# Patient Record
Sex: MALE | Race: BLACK OR AFRICAN AMERICAN | Employment: OTHER | URBAN - METROPOLITAN AREA
[De-identification: names, ages, dates, MRNs, and addresses within clinical notes are randomized per-mention and may not be internally consistent; named-entity substitution may affect disease eponyms.]

---

## 2017-06-26 ENCOUNTER — OFFICE VISIT (OUTPATIENT)
Dept: CARDIOLOGY CLINIC | Age: 57
End: 2017-06-26

## 2017-06-26 VITALS
DIASTOLIC BLOOD PRESSURE: 80 MMHG | HEIGHT: 71 IN | HEART RATE: 72 BPM | SYSTOLIC BLOOD PRESSURE: 130 MMHG | WEIGHT: 250 LBS | BODY MASS INDEX: 35 KG/M2 | OXYGEN SATURATION: 95 %

## 2017-06-26 DIAGNOSIS — R00.2 PALPITATIONS: ICD-10-CM

## 2017-06-26 DIAGNOSIS — R07.89 CHEST TIGHTNESS: Primary | ICD-10-CM

## 2017-06-26 DIAGNOSIS — K21.9 GASTROESOPHAGEAL REFLUX DISEASE, ESOPHAGITIS PRESENCE NOT SPECIFIED: ICD-10-CM

## 2017-06-26 DIAGNOSIS — R06.02 SOB (SHORTNESS OF BREATH): ICD-10-CM

## 2017-06-26 NOTE — PROGRESS NOTES
1. Have you been to the ER, urgent care clinic since your last visit? Hospitalized since your last visit? No     2. Have you seen or consulted any other health care providers outside of the [address] [place] [address] since your last visit? Include any pap smears or colon screening.  No

## 2017-06-26 NOTE — PROGRESS NOTES
HPI: :I saw Clifton Wang. Jackie Posadas, in my office today in cardiovascular evaluation regarding some problems that he has had with chest pain. Mr. Moraima Vilchis is a pleasant 60year old  male, who actually was initially seen in our practice by my former associate, Dr. So Bailon, on 2/3/16 for history of mitral valve prolapse without really any other cardiovascular symptomatology at that time except for occasional palpitations. He was examined by Dr. So Bailon who felt that he did not have any mitral valve prolapse and did not recommend an echocardiogram be done since it was not going to be changing the therapy. He explained to the patient that the diagnosis of mitral valve prolapse made many years ago was most likely related to the over-calling of mitral valve prolapse, which was common 25 years ago and has now resolved with better imaging equipment and with more stringent echo criteria. The patient comes into the office today and relates that he is doing reasonably well. He has had some intermittent left-sided chest discomfort, but but this discomfort goes around to his back and there is some tenderness in the eighth paravertebral musculature on the left and the discomfort that he describes seems to go around under his breast and there is some associated tenderness with it so I do not feel that this is cardiac and he tells me at one time he was having this discomfort 2 or 3 times a week but over the past year or 2 he has been having it only very occasionally may be a few times a year. He does have complaints of some PND and orthopnea as well as occasional palpitations, but these episodes are very occasional. He has also had some wheezing and sputum production and is going to be following up with Dr. Aydee Zimmerman in this regard. Encounter Diagnoses   Name Primary?     Chest tightness Yes    SOB (shortness of breath)     Palpitations     Gastroesophageal reflux disease, esophagitis presence not specified Discussion: This gentleman's chest discomfort clearly sounds noncardiac and historically he is done some heavy physical work over the years and particularly with the tenderness that I noticed in the eighth costochondral junction parasternally on the left I strongly suspect his chest discomfort is musculoskeletal.    His auscultatory examination of the heart does not suggest any mitral valve prolapse and I do not feel that further workup for that potential problem is really necessary at this time. His blood pressure appears to be borderline elevated initially but I checked it again myself and got 130/80 and with a stable appearing electrocardiogram I think at this juncture we can simply begin to follow him on an as-needed basis in the future. PCP: Leora Vasquez MD      Past Medical History:   Diagnosis Date    Diabetes ([address]     Borderline    GERD (gastroesophageal reflux disease)     H/O mitral valve prolapse     ASYMPTOMATIC       Past Surgical History:   Procedure Laterality Date    HX ORTHOPAEDIC      left knee surgery    HX ORTHOPAEDIC Left     Arm       Current Outpatient Rx   Name  Route  Sig  Dispense  Refill    calcium carbonate (TUMS) 200 mg calcium (500 mg) chew    Oral    Take 1 Tab by mouth as needed. No Known Allergies    Social History:   Social History   Substance Use Topics    Smoking status: Never Smoker    Smokeless tobacco: Never Used    Alcohol use Yes      Comment: socially         Family history: family history includes Cancer in his father; Hearing Impairment (age of onset: 76) in his maternal uncle; Hypertension in his maternal uncle. Review of Systems:   Constitutional: Positive for diaphoresis and malaise/fatigue. Negative for chills, fever and weight loss. Respiratory: Positive for cough, sputum production, shortness of breath and wheezing. Negative for hemoptysis.     Cardiovascular: Positive for chest pain, palpitations, orthopnea, claudication and PND. Negative for leg swelling. Gastrointestinal: Positive for heartburn, melena and nausea. Negative for abdominal pain, blood in stool, constipation, diarrhea and vomiting. Musculoskeletal: Positive for back pain, joint pain, myalgias and neck pain. Negative for falls. Neurological: Negative for weakness. Physical Exam:   The patient is an alert, oriented, well developed, well nourished 60 y.o.  male who was in no acute distress at the time of my examination. Visit Vitals    /80 (BP 1 Location: Left arm, BP Patient Position: Sitting)    Pulse 72    Ht 5' 11\" (1.803 m)    Wt 113.4 kg (250 lb)    SpO2 95%    BMI 34.87 kg/m2      BP Readings from Last 3 Encounters:   06/26/17 130/80   06/28/16 138/84   05/10/16 133/85      Wt Readings from Last 3 Encounters:   06/26/17 113.4 kg (250 lb)   06/28/16 113.4 kg (250 lb)   05/10/16 113.4 kg (250 lb)     HEENT: Conjuctiva white, mucosa moist, no pallor or cyanosis. NECK: Supple without masses, tenderness or thyromegaly. There was no jugular venous distention. Carotid are full bilaterally without bruits. CHEST: Symmetrical with good excursion. LUNGS: Clear to auscultation in all fields. HEART: Regular rate and rhythm. The apex is not displaced. There were no lifts, thrills or heaves. There is a normal S1 and S2 without appreciable murmurs, rubs, clicks, or gallops auscultated. ABDOMEN: Soft without masses, tenderness or organomegaly. EXTREMITIES: Full peripheral pulses without peripheral edema. INTEGUMENT: Skin is warm and dry   NEUROLOGICAL: The patient was oriented x3 with motor function grossly intact.     Review of Data: See PMH and Cardiology and Imaging sections for cardiac testing  No results found for: CHOL, CHOLX, CHLST, CHOLV, HDL, LDL, LDLC, DLDLP, TGLX, TRIGL, TRIGP, CHHD, CHHDX    Results for orders placed or performed in visit on 06/26/17   AMB POC EKG ROUTINE W/ 12 LEADS, INTER & REP     Status: None    Narrative    Normal sinus rhythm rate 72. This EKG is within normal limits and similar to the EKG of June 28, 2016. Renny Fitzgerald D.O., FRICKYC.C. [facility]  [facility] [place]  [address] [place] [address]. [address] [facility]  [phone]    PLEASE NOTE:  This document has been produced using voice recognition software. Unrecognized errors in transcription may be present.

## 2017-06-26 NOTE — PROGRESS NOTES
Review of Systems   Constitutional: Positive for diaphoresis and malaise/fatigue. Negative for chills, fever and weight loss. Respiratory: Positive for cough, sputum production, shortness of breath and wheezing. Negative for hemoptysis. Cardiovascular: Positive for chest pain, palpitations, orthopnea, claudication and PND. Negative for leg swelling. Gastrointestinal: Positive for heartburn, melena and nausea. Negative for abdominal pain, blood in stool, constipation, diarrhea and vomiting. Musculoskeletal: Positive for back pain, joint pain, myalgias and neck pain. Negative for falls. Neurological: Negative for weakness.

## 2017-06-26 NOTE — MR AVS SNAPSHOT
Visit Information Date & Time Provider Department Dept. Phone Encounter #  
 6/26/2017  1:20 PM Lorie Salcido DO [facility] [address] [id number] Upcoming Health Maintenance Date Due Hepatitis C Screening 1960 HEMOGLOBIN A1C Q6M 1960 LIPID PANEL Q1 1960 FOOT EXAM Q1 10/19/1968 MICROALBUMIN Q1 10/19/1968 EYE EXAM RETINAL OR DILATED Q1 10/19/1968 Pneumococcal 19-64 Medium Risk (1 of 1 - PPSV23) 10/19/1977 DTaP/Tdap/Td series (1 - Tdap) 10/19/1979 FOBT Q 1 YEAR AGE 50-75 10/19/2008 INFLUENZA AGE 9 TO ADULT 8/1/2017 Allergies as of 6/26/2017  Review Complete On: 6/26/2017 By: Lorie Salcido DO No Known Allergies Current Immunizations  Never Reviewed No immunizations on file. Not reviewed this visit You Were Diagnosed With   
  
 Codes Comments Chest tightness    -  Primary ICD-10-CM: R07.89 ICD-9-CM: 786.59   
 SOB (shortness of breath)     ICD-10-CM: R06.02 
ICD-9-CM: 786.05 Vitals BP Pulse Height(growth percentile) Weight(growth percentile) SpO2 BMI  
 140/78 72 5' 11\" (1.803 m) 250 lb (113.4 kg) 95% 34.87 kg/m2 Smoking Status Never Smoker Vitals History BMI and BSA Data Body Mass Index Body Surface Area 34.87 kg/m 2 2.38 m 2 Preferred Pharmacy Pharmacy Name Phone [id number] [phone] [address] [phone] Your Updated Medication List  
  
   
This list is accurate as of: 6/26/17  2:13 PM.  Always use your most recent med list.  
  
  
  
  
 calcium carbonate 200 mg calcium (500 mg) Charol Aver Commonly known as:  TUMS Take 1 Tab by mouth as needed. We Performed the Following AMB POC EKG ROUTINE W/ 12 LEADS, INTER & REP [54320 CPT(R)] Introducing [facility]!    
 William Montano introduces [employer] patient portal. Now you can access parts of your medical record, email your doctor's office, and request medication refills online. 1. In your internet browser, go to [email or web address] 2. Click on the First Time User? Click Here link in the Sign In box. You will see the New Member Sign Up page. 3. Enter your [employer] Access Code exactly as it appears below. You will not need to use this code after youve completed the sign-up process. If you do not sign up before the expiration date, you must request a new code. · [employer] Access Code: [email or web address] Expires: 9/24/2017  1:23 PM 
 
4. Enter the last four digits of your Social Security Number (xxxx) and Date of Birth (mm/dd/yyyy) as indicated and click Submit. You will be taken to the next sign-up page. 5. Create a [employer] ID. This will be your [employer] login ID and cannot be changed, so think of one that is secure and easy to remember. 6. Create a [employer] password. You can change your password at any time. 7. Enter your Password Reset Question and Answer. This can be used at a later time if you forget your password. 8. Enter your e-mail address. You will receive e-mail notification when new information is available in [address] 9. Click Sign Up. You can now view and download portions of your medical record. 10. Click the Download Summary menu link to download a portable copy of your medical information. If you have questions, please visit the Frequently Asked Questions section of the [employer] website. Remember, [employer] is NOT to be used for urgent needs. For medical emergencies, dial 911. Now available from your iPhone and Android! Please provide this summary of care documentation to your next provider. Your primary care clinician is listed as Katya Torres. If you have any questions after today's visit, please call [phone].

## 2017-12-13 ENCOUNTER — HOSPITAL ENCOUNTER (OUTPATIENT)
Dept: GENERAL RADIOLOGY | Age: 57
Discharge: HOME | End: 2017-12-13
Attending: FAMILY MEDICINE
Payer: MEDICAID

## 2017-12-13 DIAGNOSIS — M79.604 RIGHT LEG PAIN: ICD-10-CM

## 2017-12-13 PROCEDURE — 73552 X-RAY EXAM OF FEMUR 2/>: CPT

## 2017-12-16 ENCOUNTER — HOSPITAL ENCOUNTER (OUTPATIENT)
Age: 57
Discharge: HOME | End: 2017-12-16
Attending: FAMILY MEDICINE
Payer: MEDICAID

## 2017-12-16 DIAGNOSIS — M25.551 RIGHT HIP PAIN: ICD-10-CM

## 2017-12-16 PROCEDURE — 73721 MRI JNT OF LWR EXTRE W/O DYE: CPT

## 2020-08-18 ENCOUNTER — HOSPITAL ENCOUNTER (OUTPATIENT)
Dept: GENERAL RADIOLOGY | Age: 60
Discharge: HOME | End: 2020-08-18
Payer: COMMERCIAL

## 2020-08-18 DIAGNOSIS — M25.561 PAIN, JOINT, KNEE, RIGHT: ICD-10-CM

## 2020-08-18 PROCEDURE — 73562 X-RAY EXAM OF KNEE 3: CPT

## 2020-10-02 ENCOUNTER — OFFICE VISIT (OUTPATIENT)
Dept: ORTHOPEDIC SURGERY | Age: 60
End: 2020-10-02
Payer: COMMERCIAL

## 2020-10-02 VITALS
BODY MASS INDEX: 34.22 KG/M2 | HEIGHT: 71 IN | DIASTOLIC BLOOD PRESSURE: 88 MMHG | TEMPERATURE: 96.2 F | RESPIRATION RATE: 15 BRPM | SYSTOLIC BLOOD PRESSURE: 136 MMHG | WEIGHT: 244.4 LBS | HEART RATE: 79 BPM

## 2020-10-02 DIAGNOSIS — M22.2X1 PATELLOFEMORAL DISORDER, RIGHT: Primary | ICD-10-CM

## 2020-10-02 DIAGNOSIS — M23.41 LOOSE, BODY, JOINT, KNEE, RIGHT: ICD-10-CM

## 2020-10-02 PROCEDURE — 99204 OFFICE O/P NEW MOD 45 MIN: CPT | Performed by: FAMILY MEDICINE

## 2020-10-02 RX ORDER — MELOXICAM 15 MG/1
TABLET ORAL
Qty: 90 TAB | Refills: 0 | Status: SHIPPED | OUTPATIENT
Start: 2020-10-02 | End: 2022-07-27

## 2020-10-02 NOTE — Clinical Note
Please send copy this note to Dr. Elio Moon at [facility] on [employer].

## 2020-10-02 NOTE — PROGRESS NOTES
AVS reviewed: YES  HEP: Pt declined demo  Resources Provided: YES YouTube Videos & PT order  Patient questions/concerns answered: NO. Pt denied questions/concerns  Patient verbalized understanding of treatment plan: YES

## 2020-10-02 NOTE — PROGRESS NOTES
HISTORY OF PRESENT ILLNESS    Rossy Wang Sr. 1960 is a 62y.o. year old male comes in today as new patient for: right knee pain    Patients symptoms have been present for 6 months. Pain level 5/10 anterior. It has worsened with certain motions and will want to hyperextend. Patient has tried:  Het/baths, icy hot and brace with benefit. It is described as pain after falling on knee playing football 6 months ago. IMAGING: XR right knee 8/18/2020  IMPRESSION:  No acute radiographic findings. Notable degenerative changes characterized by  large suprapatellar loose body, also present on prior. Past Surgical History:   Procedure Laterality Date    HX ORTHOPAEDIC      left knee surgery    HX ORTHOPAEDIC Left     Arm     Social History     Socioeconomic History    Marital status: SINGLE     Spouse name: Not on file    Number of children: Not on file    Years of education: Not on file    Highest education level: Not on file   Tobacco Use    Smoking status: Never Smoker    Smokeless tobacco: Never Used   Substance and Sexual Activity    Alcohol use: Yes     Comment: socially    Drug use: No    Sexual activity: Not Currently      Current Outpatient Medications   Medication Sig Dispense Refill    calcium carbonate (TUMS) 200 mg calcium (500 mg) chew Take 1 Tab by mouth as needed. Past Medical History:   Diagnosis Date    Diabetes ([address]     Borderline    GERD (gastroesophageal reflux disease)     H/O mitral valve prolapse     ASYMPTOMATIC     Family History   Problem Relation Age of Onset    Cancer Father     Hearing Impairment Maternal Uncle 76    Hypertension Maternal Uncle          ROS:  No locking. + bruise, no swell. All other systems reviewed and negative aside from that written in the HPI. Objective:  /88   Pulse 79   Temp (!) 96.2 °F (35.7 °C)   Resp 15   Ht 5' 11\" (1.803 m)   Wt 244 lb 6.4 oz (110.9 kg)   BMI 34.09 kg/m²   GEN: Appears stated age in NAD.   HEAD: Normocephalic, atraumatic. NEURO:  Sensation intact light touch B/L lower extremities. MS:  LE Strength +5/5 bilateral .   right Knee:  1+ Effusion . Lachman negative. Valgus negative. Varus negative. negative joint line tenderness medial.  Mohsen negative. Posterior drawer negative. Noble compression test negative. Patellar apprehension negative. Patellar facet  positive tender to palpation medial no crepitance bilateral .  Pes anserine negative TTP bilateral   EXT: no clubbing/cyanosis. no edema. SKIN: Warm/dry without rash. HEENT: Conjunctiva/lids WNL. External canals/nares WNL. Tongue midline. PERRL, EOMI. Hearing intact. NECK: Trachea midline. Supple, Full ROM. No thyromegaly. CARDIAC: No edema. LUNGS: Normal effort. ABD: Soft, no masses. No HSM. PSYCH: A+O x3. Appropriate judgment and insight. Assessment/Plan:     ICD-10-CM ICD-9-CM    1. Patellofemoral disorder, right  M22.2X1 719.96 REFERRAL TO PHYSICAL THERAPY      meloxicam (MOBIC) 15 mg tablet   2. Loose, body, joint, knee, right  M23.41 717.6 REFERRAL TO PHYSICAL THERAPY      meloxicam (MOBIC) 15 mg tablet       Patient (or guardian if minor) verbalizes understanding of evaluation and plan. Will start PT and HEP w/ mobic dialy an dice if sore. RTC 6 weeks.

## 2020-10-02 NOTE — PATIENT INSTRUCTIONS
Follow-up on referral to physical therapy, perform home exercises, use medication as prescribed, ice as needed, and return to the office in about 6 weeks. Search YouTube for my channel: 
 
Dr. Alexandria Rangel

## 2020-10-14 ENCOUNTER — HOSPITAL ENCOUNTER (OUTPATIENT)
Dept: PHYSICAL THERAPY | Age: 60
Discharge: HOME | End: 2020-10-14
Payer: COMMERCIAL

## 2020-10-14 PROCEDURE — 97110 THERAPEUTIC EXERCISES: CPT

## 2020-10-14 PROCEDURE — 97161 PT EVAL LOW COMPLEX 20 MIN: CPT

## 2020-10-14 NOTE — PROGRESS NOTES
[address] [facility] PHYSICAL THERAPY AT [facility] [address] [place], [address] [facility] [address] - Phone: [phone]  Fax: [phone] / [address] [facility]  Patient Name: Arabella Marie Sr. : 1960   Medical   Diagnosis: Right knee pain [M25.561] Treatment Diagnosis: Right Knee Pain   Onset Date: 2020     Referral Source: [place] Blank Start of Care [facility]): 10/14/2020   Prior Hospitalization: See medical history Provider #: [id number]   Prior Level of Function: Hx left knee pain   Comorbidities: Bilateral knee OA   Medications: Verified on Patient Summary List   The Plan of Care and following information is based on the information from the initial evaluation.   ===========================================================================================  Assessment / key information:  Opal John is a 62 y.o.  yo male with Dx of Right knee pain [M25.561]. Patient reports onset of symptoms in July of this year after playing with grandchildren and falling on left knee. Patient currently rates pain as 8/10 at worst, 1/10 at best, primarily located at right knee. Patient complains of difficulty and increase pain upon waking and with walking, stairs and squatting. Objective Findings:  Gait: decreased bilateral knee extension. Right knee AROM: 0-115 degrees. Manual Muscle Testin+/5 bilateral LE strength. Quad Set: R = reduced , L = reduced. Lateral glide with decreased right VMO activation. Mild edema right knee. FOTO Score 55 points.  Pt instructed in HEP and will f/u in clinic for PT.  ===========================================================================================  Eval Complexity: History HIGH Complexity :3+ comorbidities / personal factors will impact the outcome/ POC ;  Examination  HIGH Complexity : 4+ Standardized tests and measures addressing body structure, function, activity limitation and / or participation in recreation ; Presentation LOW Complexity : Stable, uncomplicated ;  Decision Making MEDIUM Complexity : FOTO score of 26-74; Overall Complexity LOW   Problem List: pain affecting function, decrease ROM, decrease strength, impaired gait/ balance, decrease ADL/ functional abilitiies and decrease activity tolerance   Treatment Plan may include any combination of the following: Therapeutic exercise, Neuromuscular re-education, Physical agent/modality, Gait/balance training, Manual therapy and Patient education  Patient / Family readiness to learn indicated by: asking questions, trying to perform skills and interest  Persons(s) to be included in education: patient (P)  Barriers to Learning/Limitations: None  Measures taken, if barriers to learning:    Patient Goal (s): \"Strength in knee\"   Patient self reported health status: excellent  Rehabilitation Potential: good   Short Term Goals: To be accomplished in  2  weeks:  1. Patient will achieve +2 on GROC to improve tolerance to ADLs. 2. Patient will increase FOTO Score to 61 to improve tolerance to ADLs. 3. Patient will report 3/10 pain at worst to improve tolerance to ADLs.  Long Term Goals: To be accomplished in 4  weeks:  1. Patient will achieve +4 on GROC to improve tolerance to ADLs. 2.  Patient will increase FOTO Score to 67 to improve tolerance to ADLs. 3.  Patient will report 75% improvement in symptoms to improve to ADLs. Frequency / Duration:   Patient to be seen  2  times per week for 4  weeks:  Patient / Caregiver education and instruction: exercises  Therapist Signature: STEPHANIE Agarwal Date: 62/99/9409   Certification Period: n/a Time: 6:06 PM   ===========================================================================================  I certify that the above Physical Therapy Services are being furnished while the patient is under my care.   I agree with the treatment plan and certify that this therapy is necessary. Physician Signature:        Date:       Time:     Please sign and return to [facility] or you may fax the signed copy to [phone]. Thank you.

## 2020-10-14 NOTE — PROGRESS NOTES
PHYSICAL THERAPY - DAILY TREATMENT NOTE      Patient Name: Alyssa Johnson Sr.        Date: 10/14/2020  : 1960   YES Patient  Verified  Visit #:   1   of   8  Insurance: Payor: Margaret Vazquez / Plan: [employer] HMO/CHOICE PLUS/POS / Product Type: HMO /      In time: 5:30 Out time: 6:05   Total Treatment Time: 35     Medicare Time/BCBS Tracking (below)   Total Timed Codes (min):  n/a 1:1 Treatment Time:  n/a     TREATMENT AREA = Right knee pain [M25.561]     SUBJECTIVE    Pain Level (on 0 to 10 scale):  1  / 10   Medication Changes/New allergies or changes in medical history, any new surgeries or procedures?     NO    If yes, update Summary List   Subjective Functional Status/Changes:  []  No changes reported       See Plan of Care       OBJECTIVE  Modalities Rationale:     decrease pain to improve patient's ability to perform ADLs      min [] Estim, type/location:                                      []  att     []  unatt     []  w/US     []  w/ice    []  w/heat    min []  Mechanical Traction: type/lbs                   []  pro   []  sup   []  int   []  cont    []  before manual    []  after manual    min []  Ultrasound, settings/location:      min []  Iontophoresis w/ dexamethasone, location:                                               []  take home patch       []  in clinic   10 min []  Ice     [x]  Heat    location/position: Supine right knee    min []  Vasopneumatic Device, press/temp:     min []  Other:    [x] Skin assessment post-treatment (if applicable):    []  intact    [x]  redness- no adverse reaction     []redness  adverse reaction:      10 min Therapeutic Exercise:  [x]  See flow sheet   Rationale:      increase ROM and increase strength to improve the patients ability to perform ADLs     Billed With/As:   [x] TE   [] TA   [] Neuro   [] Self Care Patient Education: [x] Review HEP    [] Progressed/Changed HEP based on:   [] positioning   [] body mechanics   [] transfers [] heat/ice application    [] other:      Other Objective/Functional Measures:    See Plan of Care     Post Treatment Pain Level (on 0 to 10) scale:   1  / 10     ASSESSMENT    Assessment/Changes in Function:     See Plan of Care     []  See Progress Note/Recertification   Patient will continue to benefit from skilled PT services to modify and progress therapeutic interventions, address functional mobility deficits, address ROM deficits, address strength deficits, analyze and address soft tissue restrictions, analyze and cue movement patterns, analyze and modify body mechanics/ergonomics and assess and modify postural abnormalities to attain remaining goals. to attain remaining goals. Progress toward goals / Updated goals:    See Plan of Care     PLAN    [x]  Upgrade activities as tolerated YES Continue plan of care   []  Discharge due to :    []  Other:      Therapist: Juan Izaguirre PT    Date: 10/14/2020 Time: 6:00 PM   No future appointments.

## 2020-10-21 ENCOUNTER — HOSPITAL ENCOUNTER (OUTPATIENT)
Dept: PHYSICAL THERAPY | Age: 60
Discharge: HOME | End: 2020-10-21
Payer: COMMERCIAL

## 2020-10-21 PROCEDURE — 97110 THERAPEUTIC EXERCISES: CPT

## 2020-10-21 PROCEDURE — 97140 MANUAL THERAPY 1/> REGIONS: CPT

## 2020-10-21 NOTE — PROGRESS NOTES
PHYSICAL THERAPY - DAILY TREATMENT NOTE    Patient Name: Lizzette Dos Santos Sr.        Date: 10/21/2020  : 1960   yes Patient  Verified  Visit #:   2   of   8  Insurance: Payor: Chao Alvarez / Plan: [employer] HMO/CHOICE PLUS/POS / Product Type: HMO /      In time: 4:02 Out time: 4:50   Total Treatment Time: 48     Medicare/[facility] Time Tracking (below)   Total Timed Codes (min):  48 1:1 Treatment Time:  48     TREATMENT AREA =  Right knee pain [M25.561]    SUBJECTIVE  Pain Level (on 0 to 10 scale):  0   / 10   Medication Changes/New allergies or changes in medical history, any new surgeries or procedures?    no  If yes, update Summary List   Subjective Functional Status/Changes:  []  No changes reported     Doing a little better. No pain right now but pain last night was like a 12. My pain is primarily at night. OBJECTIVE  Modalities Rationale:    PD    40 min Therapeutic Exercise:  [x]  See flow sheet   Rationale:      increase ROM and increase strength to improve the patients ability to tolerate prolonged ambulation     8 min Manual Therapy: Prone STM to R hamstring and gastroc, STM and rollator to R ITB   Rationale:      decrease pain, increase ROM, increase tissue extensibility, decrease edema  and decrease trigger points to improve patient's ability to rise in the morning    Billed With/As:   [x] TE   [] TA   [] Neuro   [] Self Care Patient Education: [x] Review HEP    [x] Progressed/Changed HEP based on:   [] positioning   [] body mechanics   [] transfers   [] heat/ice application    [] other:      Other Objective/Functional Measures:     Added incline calf str, squat, Bridge, clam, SLR w/ ER and lateral mini band  Cuing with mini squatting for incr ABDULKADIR and hip excursion in order to ensure proper form   TTP and incr mm tone to R ITB distally     Post Treatment Pain Level (on 0 to 10) scale:   0  / 10     ASSESSMENT  Assessment/Changes in Function:     Progressed R LE strengthening and flexibility program with good patient tolerance. Patient edu on Sleep positioning of the knees and use of pillow between the knees in order to decr pp on the knees with static positioning. []  See Progress Note/Recertification   Patient will continue to benefit from skilled PT services to modify and progress therapeutic interventions, address functional mobility deficits, address ROM deficits, address strength deficits, analyze and address soft tissue restrictions, analyze and cue movement patterns, analyze and modify body mechanics/ergonomics, assess and modify postural abnormalities and instruct in home and community integration to attain remaining goals. Progress toward goals / Updated goals:    First visit after initial evaluation. Progress tx per POC.         PLAN  [x]  Upgrade activities as tolerated yes Continue plan of care   []  Discharge due to :    []  Other:      Therapist: Liza Perez    Date: 10/21/2020 Time: 4:04 PM     Future Appointments   Date Time Provider Steffen Brumfield   10/26/2020  2:00 PM [address] [facility]   10/28/2020  3:15 PM [address] [facility]   11/2/2020  2:00 PM [address] [facility]   11/4/2020  3:15 PM [address] [facility]   11/9/2020  1:45 PM [address]

## 2020-10-29 ENCOUNTER — APPOINTMENT (OUTPATIENT)
Dept: PHYSICAL THERAPY | Age: 60
End: 2020-10-29
Payer: COMMERCIAL

## 2020-11-04 ENCOUNTER — APPOINTMENT (OUTPATIENT)
Dept: PHYSICAL THERAPY | Age: 60
End: 2020-11-04

## 2020-11-09 ENCOUNTER — APPOINTMENT (OUTPATIENT)
Dept: PHYSICAL THERAPY | Age: 60
End: 2020-11-09

## 2020-12-02 NOTE — PROGRESS NOTES
[address] PHYSICAL THERAPY  [address] [place] [address], [place] [address],[facility], [address] [place] [address] - Phone: [phone]  Fax: [phone]  DISCHARGE SUMMARY  Patient Name: Alisa Norris Sr. : 1960   Treatment/Medical Diagnosis: Right knee pain [M25.561]   Referral Source: Mendy Degroot DO     Date of Initial Visit: 10/14/2020 Attended Visits: 2 Missed Visits: 4     SUMMARY OF TREATMENT  Patient has attended 2 PT sessions, including an initial evaluation, for R knee pain. PT has included manual therapy, therapeutic exercises, patient education, body mechanics, posture modification, and home exercise program to improve AROM/flexibility and quad/glute stability as well as decrease pain. CURRENT STATUS  Patient attended only initial evaluation and 1 follow-ups and then did not return. Therefore, a formal re-assessment of goals was not performed. Goal/Measure of Progress Goal Met? 1. Patient will achieve +2 on GROC to improve tolerance to ADLs. Status at last Eval: Goal Established Current Status: Unable to be assessed no   2. Patient will increase FOTO Score to 61 to improve tolerance to ADLs. Status at last Eval: FOTO = 55/100 Current Status: Unable to be assessed no   3. Patient will report 3/10 pain at worst to improve tolerance to ADLs. Status at last Eval: 8/10 pain at the worst Current Status: Unable to be assessed no     RECOMMENDATIONS  Discontinue therapy due to lack of attendance or compliance. If you have any questions/comments please contact us directly at [phone]. Thank you for allowing us to assist in the care of your patient.     Therapist Signature: Jose L Ryan Date: 10/21/2020     Time: 1:37 PM

## 2021-03-02 ENCOUNTER — OFFICE VISIT (OUTPATIENT)
Dept: NEUROLOGY | Age: 61
End: 2021-03-02
Payer: COMMERCIAL

## 2021-03-02 VITALS
BODY MASS INDEX: 34.44 KG/M2 | SYSTOLIC BLOOD PRESSURE: 130 MMHG | TEMPERATURE: 97.9 F | WEIGHT: 246 LBS | RESPIRATION RATE: 18 BRPM | HEIGHT: 71 IN | HEART RATE: 67 BPM | DIASTOLIC BLOOD PRESSURE: 80 MMHG

## 2021-03-02 DIAGNOSIS — R20.2 NUMBNESS AND TINGLING OF RIGHT HAND: Primary | ICD-10-CM

## 2021-03-02 DIAGNOSIS — R20.0 NUMBNESS AND TINGLING OF RIGHT HAND: Primary | ICD-10-CM

## 2021-03-02 PROCEDURE — 99204 OFFICE O/P NEW MOD 45 MIN: CPT | Performed by: NURSE PRACTITIONER

## 2021-03-02 NOTE — PROGRESS NOTES
Parish Hughes presents today for   Chief Complaint   Patient presents with    Numbness    New Patient       Is someone accompanying this pt? no    Is the patient using any DME equipment during OV? no    Depression Screening:  No flowsheet data found. Learning Assessment:  Learning Assessment 6/28/2016   PRIMARY LEARNER Patient   PRIMARY LANGUAGE ENGLISH   LEARNER PREFERENCE PRIMARY DEMONSTRATION   ANSWERED BY patient   RELATIONSHIP SELF       Abuse Screening:  No flowsheet data found. Fall Risk  No flowsheet data found. Coordination of Care:  1. Have you been to the ER, urgent care clinic since your last visit? Hospitalized since your last visit? no    2. Have you seen or consulted any other health care providers outside of the [address] [place] [address] since your last visit? Include any pap smears or colon screening.  no

## 2021-03-02 NOTE — PROGRESS NOTES
[facility]  [address], [address], [place], [address]  [address] [facility], [address]  Office:  [phone]  Fax: [phone]    Referring: Marleny Bass MD    Chief Complaint   Patient presents with    Numbness    New Patient       HPI:  This is a 56year old male who presents for numbness and tingling to the hand. Localized to the right hand. He is right hand dominant. Started about a year and half ago maybe two years ago. Encompasses his entire right hand to his wrist. Associated with pins and needles. Waking him up at night. Occurs solely when he is lying flat. Can go to bed and one hour later his right hand will start tingling. He said he will have to dangle his arm off the side of the bed and move his hand around for it to stop. Does not happen during the day. Denies weakness or dropping things. 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 He endorses history of sheet of glass coming down and cutting him in his left forearm. Caused nerve damage to his left hand 5th digit. Denies neck pain or pain going down the arms. Denies headaches, lightheadedness, or dizziness. Denies focal weakness. He tells me he is prediabetic. Denies numbness tingling to the left arm or lower extremities. No other concerns at this time.       Social History     Socioeconomic History    Marital status: SINGLE     Spouse name: Not on file    Number of children: Not on file    Years of education: Not on file    Highest education level: Not on file   Occupational History    Not on file   Social Needs    Financial resource strain: Not on file    Food insecurity     Worry: Not on file     Inability: Not on file    Transportation needs     Medical: Not on file     Non-medical: Not on file   Tobacco Use    Smoking status: Never Smoker    Smokeless tobacco: Never Used   Substance and Sexual Activity    Alcohol use: Yes     Comment: socially  Drug use: No    Sexual activity: Not Currently   Lifestyle    Physical activity     Days per week: Not on file     Minutes per session: Not on file    Stress: Not on file   Relationships    Social connections     Talks on phone: Not on file     Gets together: Not on file     Attends Taoism service: Not on file     Active member of club or organization: Not on file     Attends meetings of clubs or organizations: Not on file     Relationship status: Not on file    Intimate partner violence     Fear of current or ex partner: Not on file     Emotionally abused: Not on file     Physically abused: Not on file     Forced sexual activity: Not on file   Other Topics Concern    Not on file   Social History Narrative    Not on file       Family History   Problem Relation Age of Onset    Cancer Father     Hearing Impairment Maternal Uncle 76    Hypertension Maternal Uncle        Current Outpatient Medications   Medication Sig Dispense Refill    meloxicam (MOBIC) 15 mg tablet Take 1 tab daily as needed pain with food. 90 Tab 0    calcium carbonate (TUMS) 200 mg calcium (500 mg) chew Take 1 Tab by mouth as needed.          Past Medical History:   Diagnosis Date    Diabetes ([address]     Borderline    GERD (gastroesophageal reflux disease)     H/O mitral valve prolapse     ASYMPTOMATIC       Past Surgical History:   Procedure Laterality Date    HX ORTHOPAEDIC      left knee surgery    HX ORTHOPAEDIC Left     Arm       No Known Allergies    Patient Active Problem List   Diagnosis Code    Arm contusion S40.46A    Sprain and strain of shoulder and upper arm [id number]    Diabetes ([address] E11.9    GERD (gastroesophageal reflux disease) K21.9    H/O mitral valve prolapse Z86.79         Review of Systems:   Constitutional: no fever or chills  Skin denies rash or itching  HEENT:  Denies tinnitus, hearing loss, or visual changes  Respiratory: denies shortness of breath  Cardiovascular: denies chest pain, dyspnea on exertion  Gastrointestinal: does not report nausea or vomiting  Genitourinary: does not report dysuria or incontinence  Musculoskeletal: does not report joint pain or swelling  Endocrine: denies weight change  Hematology: denies easy bruising or bleeding   Neurological: as above in HPI      PHYSICAL EXAMINATION:      VITAL SIGNS:    Visit Vitals  /80 (BP 1 Location: Left upper arm, BP Patient Position: Sitting, BP Cuff Size: Adult)   Pulse 67   Temp 97.9 °F (36.6 °C)   Resp 18   Ht 5' 11\" (1.803 m)   Wt 111.6 kg (246 lb)   BMI 34.31 kg/m²       GENERAL: Well developed, well nourished, in no apparent distress. HEART: RR, no murmurs heard, no carotid bruits  LUNGS:                      CTAB  EXTREMITIES: No clubbing, cyanosis, or edema is identified. Pulses 2+    and symmetrical.  HEAD:   Normocephalic, atraumatic. NEUROLOGIC EXAMINATION    MENTAL STATUS: Awake, alert, and oriented x 4. Attention and STM are grossly normal. There is no aphasia. Fund of knowledge is adequate. Mood and affect are appropriate  CRANIAL NERVES: Visual fields are full to confrontation. No fundus anomalies observed. Pupils are reactive to light and accommodation. Extraocular movements are intact and there is no nystagmus. Facial sensation is normal  Face is symmetrical.   Hearing is grossly intact. SCM/TPZ 5/5  Palate rises symmetrically. Tongue is in the midline. MOTOR:   Normal tone, bulk, and strength, 5/5 muscle strength throughout left hand 5th digit contracted. No cogwheel rigidity or clonus present. CEREBELLAR: Finger to nose was normal.   No tremors or dysmetria    SENSORY:  Normal PP, vibration, proprioception despite decreased sensation to left hand 5th digit.  Negative tinel and phalens test. Romberg negative    DTR's:   +2 throughout, toes downgoing     GAIT:   Normal gait      Impression/Plan  Nereyda Patel. is a 56 y.o. male whose history and physical are consistent with tingling to the right hand upon lying flat. DDx includes mononeuropathy vs CTS vs other. Has been going on for the past 2 years. Examination is reassuringly non focal.  Will order EMG of the upper extremities. I will see him back for results afterwards and discuss treatment plans pending results. All questions addressed and patient is agreeable with plan of care. Diagnoses and all orders for this visit:    1. Numbness and tingling of right hand  -     EMG LIMITED      I spent 45 minutes with the patient in face-to-face consultation, with 35 minutes spent in counseling and coordination of care as described above. This note will not be viewable in [address] Signed By: Dominga Boucher NP        PLEASE NOTE:   Portions of this document may have been produced using voice recognition software. Unrecognized errors in transcription may be present.

## 2021-04-21 ENCOUNTER — HOSPITAL ENCOUNTER (OUTPATIENT)
Dept: PHYSICAL THERAPY | Age: 61
Discharge: HOME | End: 2021-04-21
Payer: MEDICAID

## 2021-04-21 PROCEDURE — 97162 PT EVAL MOD COMPLEX 30 MIN: CPT | Performed by: GENERAL ACUTE CARE HOSPITAL

## 2021-04-21 PROCEDURE — 97535 SELF CARE MNGMENT TRAINING: CPT | Performed by: GENERAL ACUTE CARE HOSPITAL

## 2021-04-21 PROCEDURE — 97110 THERAPEUTIC EXERCISES: CPT | Performed by: GENERAL ACUTE CARE HOSPITAL

## 2021-04-21 NOTE — PROGRESS NOTES
PHYSICAL THERAPY - DAILY TREATMENT NOTE     Patient Name: Carine Warren Sr.        Date: 2021  : 1960   YES Patient  Verified  Visit #:      12  Insurance: Payor: Wayne Pastrana / Plan: [facility] [employer]AN [employer] / Product Type: Managed Care Medicaid /      In time: 335 Out time: 425   Total Treatment Time: 50     Medicare/[employer] Time Tracking (below)   Total Timed Codes (min):  16 - NC 1:1 Treatment Time:  50     TREATMENT AREA =  Pain in right knee [M25.561]    SUBJECTIVE    Pain Level (on 0 to 10 scale):  3-4  / 10   Medication Changes/New allergies or changes in medical history, any new surgeries or procedures? NO    If yes, update Summary List   Subjective Functional Status/Changes:  []  No changes reported       See POC         OBJECTIVE    8 min Therapeutic Exercise:  [x]  See flow sheet; NC per insurance restrictions   Rationale:      increase ROM and increase strength to improve the patients ability to squat, amb, stairs       8 min Self Care: edu on proper squat mechanics, exercises to avoid (knee ext machine past 90 deg flexion), avoid sleeping with pillow under knee at night. Edu on importance of strengthening quad and gluts for improved mobility. NC per insurance restrictions    Rationale: To educate patient about proper squatting, lifting mechanics, and injury prevention    Billed With/As:   [] TE   [] TA   [] Neuro   [] Self Care Patient Education: [x] Review HEP    [] Progressed/Changed HEP based on:   [] positioning   [] body mechanics   [] transfers   [] heat/ice application    [] other: Given initial HEP - see chart for copy.         Other Objective/Functional Measures:    Justification for Eval Code Complexity:  Patient History : no comorbidities, L knee surgery at 10 y/o  Examination see exam   Clinical Presentation: evolving  Clinical Decision Making : FOTO : 43 /100       Post Treatment Pain Level (on 0 to 10) scale:   3  / 10 ASSESSMENT    Assessment/Changes in Function:     See POC     []  See Progress Note/Recertification   Patient will continue to benefit from skilled PT services to modify and progress therapeutic interventions, address functional mobility deficits, address ROM deficits, address strength deficits, analyze and address soft tissue restrictions, analyze and cue movement patterns, analyze and modify body mechanics/ergonomics, assess and modify postural abnormalities, address imbalance/dizziness and instruct in home and community integration to attain remaining goals.       Progress toward goals / Updated goals:     See POC       PLAN    [x]  Upgrade activities as tolerated YES Continue plan of care   []  Discharge due to :    []  Other:      Therapist: Ene Cooney PT    Date: 4/21/2021 Time: 10:42 AM     Future Appointments   Date Time Provider Steffen Brumfield   4/21/2021  3:30 PM [address] PT [place] [facility] [address]   5/14/2021  9:30 AM [facility] NURSE VISIT [facility] BS AMB   5/26/2021  2:00 PM Glen Butterfield MD [facility] BS AMB

## 2021-04-21 NOTE — PROGRESS NOTES
[facility] PHYSICAL THERAPY AT [facility] [address] [place], [address] - Phone: [phone]  Fax: [phone] / [address] [facility]  Patient Name: Rancho Plasencia Sr. : 1960   Medical   Diagnosis: Pain in right knee [M25.561] Treatment Diagnosis: Pain in right knee [M25.561]   Onset Date: ~1 yr ago      Referral Source: Radha Balbuena MD [facility]): 2021   Prior Hospitalization: See medical history Provider #: [id number]   Prior Level of Function: Independent with ADL's. Comorbidities: None listed. H/o L knee surgery at 11 yrs old   Medications: Verified on Patient Summary List   The Plan of Care and following information is based on the information from the initial evaluation.   ===================================================================  Assessment / key information:  Pt is a 57 yo male who presents with co R knee pain that began about 9 months-1 yr ago after falling on a concrete surface. X-ray and MRI completed about 5 months ago were (-) for significant pathology, except showed some bone fragments around patella. Pt reports ongoing pain since initial fall. Pt has been wearing a neoprene brace on B knees for support for about 3 months. PMH significant for L knee surgery when pt was 13 yrs ago - states \"they removed some cartilage. \" Pt denies any previous injury to R knee prior to fall. Pt reports current pain level 3-4/10, pain level at worst 10/10, and pain level at best 3/10. Aggravating factors: standing, walking without knee braces, stairs, squatting, kneeling. Relieving factors: wearing B knee braces. Objective: Gait:  slowed diego, antalgic pattern. AROM: Knee AROM: R 0-125. SLR: good quad set, no quad lag. Patellar mobility: very guarded and sensitive to touch. Flexibility: HS 90/90 (+), Magaly (+).  Palpation: +ttp patellar tendon, all aspects around patella - very sensitive. Swelling: none observed. MMT: Hip flex 5/5, abd 4/5, ext 4/5, Knee ext 4+/5, knee flex 4+/5. Squat: genu valgus noted with pain. Stairs: reciprocal pattern up/down, no use of rail (wearing knee braces). SLS: ~5 seconds, p! Bilaterally. Pt would benefit from skilled PT intervention in order to improve upon previously mentioned subjective/objective impairments.     ===========================================================================================  Eval Complexity: History MEDIUM  Complexity : 1-2 comorbidities / personal factors will impact the outcome/ POC ;  Examination  HIGH Complexity : 4+ Standardized tests and measures addressing body structure, function, activity limitation and / or participation in recreation ; Presentation MEDIUM Complexity : Evolving with changing characteristics ; Decision Making MEDIUM Complexity : FOTO score of 26-74; Overall Complexity MEDIUM  Problem List: pain affecting function, decrease ROM, decrease strength, edema affecting function, impaired gait/ balance, decrease ADL/ functional abilitiies, decrease activity tolerance, decrease flexibility/ joint mobility and decrease transfer abilities   Treatment Plan may include any combination of the following: Therapeutic exercise, Therapeutic activities, Neuromuscular re-education, Physical agent/modality, Gait/balance training, Manual therapy, Patient education, Self Care training, Functional mobility training, Home safety training and Stair training  Patient / Family readiness to learn indicated by: asking questions, trying to perform skills and interest  Persons(s) to be included in education: patient (P)  Barriers to Learning/Limitations: None  Measures taken, if barriers to learning    Patient Goal (s): Strengthen my knee    Patient self reported health status: good  Rehabilitation Potential: good   Short Term Goals: To be accomplished in  1  weeks:  1.  Establish initial HEP and pt compliant with instructions   Long Term Goals: To be accomplished in  4-6  weeks:  1. Increase FOTO score to 59/100 indicating improved function  2. Pt will demo hip strength >4+/5 for improved squat mechanics   3. Pt will demo ability to performed a 90 degree squat without good form and no pain for retrieving floor level items  4. Pt will demo >15 sec SLS bilaterally for improved safety and stability with dressing ADL's and shower transfers  5. Pt will report >50% improvement in overall symptoms for improved activity tolerance and QOL  Frequency / Duration:   Patient to be seen  2  times per week for 4-6  weeks:  Patient / Caregiver education and instruction: activity modification and exercises  Therapist Signature: Tejal Hassan PT Date: 3/42/9598   Certification Period: na Time: 10:39 AM   ===========================================================================================    To ensure your patient receives the highest quality care and to avoid disruption in therapy please sign and return this plan of care within 21 days. Per Medicaid guidelines if the plan of care is not received within 21 days the patient's care must be put on hold until signed. I certify that the above Physical Therapy Services are being furnished while the patient is under my care. I agree with the treatment plan and certify that this therapy is necessary. Physician Signature:      Date:       Time:     Zena Mendez MD      Please sign and return to [facility] or you may fax the signed copy to [phone]. Thank you.

## 2021-04-28 ENCOUNTER — HOSPITAL ENCOUNTER (OUTPATIENT)
Dept: PHYSICAL THERAPY | Age: 61
Discharge: HOME | End: 2021-04-28
Payer: MEDICAID

## 2021-04-28 PROCEDURE — 97112 NEUROMUSCULAR REEDUCATION: CPT | Performed by: GENERAL ACUTE CARE HOSPITAL

## 2021-04-28 PROCEDURE — 97530 THERAPEUTIC ACTIVITIES: CPT | Performed by: GENERAL ACUTE CARE HOSPITAL

## 2021-04-28 PROCEDURE — 97110 THERAPEUTIC EXERCISES: CPT | Performed by: GENERAL ACUTE CARE HOSPITAL

## 2021-04-28 NOTE — PROGRESS NOTES
PHYSICAL THERAPY - DAILY TREATMENT NOTE     Patient Name: Shirley Awan Sr.        Date: 2021  : 1960   YES Patient  Verified  Visit #:   2   of   12  Insurance: Payor: Irwni Santiago / Plan: [facility] COMMUNITY PLAN [employer] CCCP / Product Type: Managed Care Medicaid /      In time: 6:03 Out time: 6:41   Total Treatment Time: 38     Medicare/BCBS Time Tracking (below)   Total Timed Codes (min):  38 1:1 Treatment Time:  38     TREATMENT AREA =  Pain in right knee [M25.561]    SUBJECTIVE    Pain Level (on 0 to 10 scale):  1  / 10   Medication Changes/New allergies or changes in medical history, any new surgeries or procedures? NO    If yes, update Summary List   Subjective Functional Status/Changes:  []  No changes reported     Pt reports completing HEP twice since IE with no issues. Overall, has been feeling pretty good.  Wearing knee brace       OBJECTIVE    15 min Therapeutic Exercise:  [x]  See flow sheet   Rationale:      increase ROM and increase strength to improve the patients ability to walk, squat, stairs     15 min Therapeutic Activity: [x]  See flow sheet   Rationale:      increase ROM, increase strength and improve coordination to improve the patients ability to walk, transfers     8 min Neuromuscular Re-ed: [x]  See flow sheet   Rationale:      improve coordination, improve balance and increase proprioception to improve the patients ability to ambulate on various surfaces, prevent falls       Billed With/As:   [] TE   [] TA   [] Neuro   [] Self Care Patient Education: [x] Review HEP    [] Progressed/Changed HEP based on:   [] positioning   [] body mechanics   [] transfers   [] heat/ice application    [] other:        Other Objective/Functional Measures:    First FU treatment - initiated exercises per FS     Post Treatment Pain Level (on 0 to 10) scale:   0  / 10     ASSESSMENT    Assessment/Changes in Function:     Good tolerance to initial treatment session with patient requiring VC's for all newly introduced exercises. TC for SL hip abduction form. Demo moderate difficulty with TB side stepping without compensating with ER - consider dropping band resistance for improved performance. Cont to progress strength/stabilization as tolerated. []  See Progress Note/Recertification   Patient will continue to benefit from skilled PT services to modify and progress therapeutic interventions, address functional mobility deficits, address ROM deficits, address strength deficits, analyze and address soft tissue restrictions, analyze and cue movement patterns, analyze and modify body mechanics/ergonomics, assess and modify postural abnormalities, address imbalance/dizziness and instruct in home and community integration to attain remaining goals. Progress toward goals / Updated goals: · Short Term Goals: To be accomplished in  1  weeks:  1. Establish initial HEP and pt compliant with instructions Reports 2/6 days compliance since IE sec to family issues- will cont to monitor (4/28/21)  · Long Term Goals: To be accomplished in  4-6  weeks:  1. Increase FOTO score to 59/100 indicating improved function  2. Pt will demo hip strength >4+/5 for improved squat mechanics   3. Pt will demo ability to performed a 90 degree squat without good form and no pain for retrieving floor level items  4. Pt will demo >15 sec SLS bilaterally for improved safety and stability with dressing ADL's and shower transfers  5.  Pt will report >50% improvement in overall symptoms for improved activity tolerance and QOL     PLAN    [x]  Upgrade activities as tolerated YES Continue plan of care   []  Discharge due to :    []  Other:      Therapist: Efrain Shaffer PT    Date: 4/28/2021 Time: 12:42 PM     Future Appointments   Date Time Provider Steffen Brumfield   4/28/2021  6:00 PM [facility]   4/30/2021  1:30 PM Renny Second [facility]   5/5/2021  2:30 PM Parth Cortes PT [facility]   5/7/2021  1:15 PM Collin Carreon, PTA [facility]   5/11/2021  1:30 PM Bohdan Rily [facility]   5/12/2021  3:30 PM Lenka Min, PT [facility]   5/14/2021  9:30 AM [facility] NURSE VISIT [facility] AM[facility]   5/19/2021  1:00 PM Jacinot Arana, PT [facility]   5/21/2021  1:30 PM Timothyn Rily [facility]   5/26/2021  2:00 PM Nelly Zhang MD [facility] BS AMB

## 2021-04-30 ENCOUNTER — HOSPITAL ENCOUNTER (OUTPATIENT)
Dept: PHYSICAL THERAPY | Age: 61
Discharge: HOME | End: 2021-04-30
Payer: MEDICAID

## 2021-04-30 PROCEDURE — 97110 THERAPEUTIC EXERCISES: CPT

## 2021-04-30 PROCEDURE — 97535 SELF CARE MNGMENT TRAINING: CPT

## 2021-04-30 PROCEDURE — 97112 NEUROMUSCULAR REEDUCATION: CPT

## 2021-04-30 NOTE — PROGRESS NOTES
PHYSICAL THERAPY - DAILY TREATMENT NOTE    Patient Name: Michele Holstein Sr.        Date: 2021  : 1960    Patient  Verified: YES  Visit #:   3   of   12  Insurance: Payor: Christina Osman / Plan: [facility] COMMUNITY PLAN [facility] CCCP / Product Type: Managed Care Medicaid /      In time: 1:30 Out time: 2:20   Total Treatment Time: 50     Medicare Time Tracking (below)   Total Timed Codes (min):  - 1:1 Treatment Time:  -     TREATMENT AREA/ DIAGNOSIS = Pain in right knee [M25.561]    SUBJECTIVE  Pain Level (on 0 to 10 scale):  2  / 10   Medication Changes/New allergies or changes in medical history, any new surgeries or procedures?     NO    If yes, update Summary List   Subjective Functional Status/Changes:  []  No changes reported     Pt reports subluxation and dislocations if he isnt wearing his knee brace      OBJECTIVE  Modalities Rationale:     decrease inflammation and decrease pain to improve patient's ability to perform ADLs without pain     min [] Estim, type/location:                                      []  att     []  unatt     []  w/US     []  w/ice    []  w/heat    min []  Mechanical Traction: type/lbs                   []  pro   []  sup   []  int   []  cont    []  before manual    []  after manual    min []  Ultrasound, settings/location:      min []  Iontophoresis w/ dexamethasone, location:                                               []  take home patch       []  in clinic   10 min [x]  Ice     []  Heat    location/position:     min []  Vasopneumatic Device, press/temp:     min []  Other:    [] Skin assessment post-treatment (if applicable):    []  intact    []  redness- no adverse reaction     []redness  adverse reaction:        15 min Therapeutic Exercise:  [x]  See flow sheet   Rationale:      increase ROM and increase strength to improve the patients ability to perform ADLs without pain     15 min Neuromuscular Re-ed: [x]  See flow sheet   Rationale:    improve balance and increase proprioception to improve the patients ability to perform ADLs without pain      10 min Self Care:    Rationale:    education to improve the patients ability to self manage    Billed With/As:   [x] TE   [] TA   [] Neuro   [] Self Care Patient Education: [x] Review HEP    [] Progressed/Changed HEP based on:   [] positioning   [] body mechanics   [] transfers   [] heat/ice application    [] other:        Other Objective/Functional Measures:    Full PROM. Patella sits superior   Post Treatment Pain Level (on 0 to 10) scale:   0  / 10     ASSESSMENT  Assessment/Changes in Function:     Pt required cueing for proper mechanics with exercises      []  See Progress Note/Recertification   Patient will continue to benefit from skilled PT services to modify and progress therapeutic interventions, address functional mobility deficits, address ROM deficits, address strength deficits, analyze and address soft tissue restrictions and analyze and cue movement patterns to attain remaining goals.    Progress toward goals / Updated goals:    Slow Progress to    [] STG    [x] LTG  1 as shown by still having pain with closed chain exercises     PLAN  [x]  Upgrade activities as tolerated YES Continue plan of care   []  Discharge due to :    []  Other:      Therapist: Marylee Settles ,DPT     Date: 4/30/2021 Time: 11:13 AM        Future Appointments   Date Time Provider Steffen Brumfield   4/30/2021  1:30 PM Vesna Aguayo [facility]   5/5/2021  2:30 PM Sharath Tong PT [facility]   5/7/2021  1:15 PM Marlon Goldman PTA [facility]   5/11/2021  1:30 PM Vesna Aguayo [facility]   5/12/2021  3:30 PM Murrell Gitelman, PT [facility]   5/14/2021  9:30 AM [facility] NURSE VISIT [facility]   5/19/2021  1:00 PM Silvana Ashraf PT [facility]   5/21/2021  1:30 PM Dillon Jackson [facility]   5/26/2021  2:00 PM Wily Granado MD [facility] BS AMB

## 2021-05-05 ENCOUNTER — HOSPITAL ENCOUNTER (OUTPATIENT)
Dept: PHYSICAL THERAPY | Age: 61
Discharge: HOME | End: 2021-05-05
Payer: MEDICAID

## 2021-05-05 PROCEDURE — 97110 THERAPEUTIC EXERCISES: CPT

## 2021-05-05 PROCEDURE — 97530 THERAPEUTIC ACTIVITIES: CPT

## 2021-05-05 PROCEDURE — 97112 NEUROMUSCULAR REEDUCATION: CPT

## 2021-05-05 NOTE — PROGRESS NOTES
PHYSICAL THERAPY - DAILY TREATMENT NOTE     Patient Name: Gigi Hussein Sr.        Date: 2021  : 1960   YES Patient  Verified  Visit #:      of   12  Insurance: Payor: Tori Ramey / Plan: [facility] COMMUNITY PLAN [employer] CCCP / Product Type: Managed Care Medicaid /      In time: 230 Out time: 315   Total Treatment Time: 45     Medicare/BCBS Time Tracking (below)   Total Timed Codes (min):   1:1 Treatment Time:       TREATMENT AREA =  Pain in right knee [M25.561]    SUBJECTIVE    Pain Level (on 0 to 10 scale):  3  / 10   Medication Changes/New allergies or changes in medical history, any new surgeries or procedures? NO    If yes, update Summary List   Subjective Functional Status/Changes:  []  No changes reported   No so afraid to walk.       Since coming to PT, don't rely on bracing as much           OBJECTIVE  Modalities Rationale:     decrease edema, decrease inflammation and decrease pain to improve patient's ability to amb/ perform ADLs with less pain      min [] Estim, type/location:                                      []  att     []  unatt     []  w/US     []  w/ice    []  w/heat    min []  Mechanical Traction: type/lbs                   []  pro   []  sup   []  int   []  cont    []  before manual    []  after manual    min []  Ultrasound, settings/location:      min []  Iontophoresis w/ dexamethasone, location:                                               []  take home patch       []  in clinic   10 min [x]  Ice     []  Heat    location/position:     min []  Vasopneumatic Device, press/temp:     min []  Other:    [x] Skin assessment post-treatment (if applicable):    [x]  intact    []  redness- no adverse reaction     []redness  adverse reaction:        15 min Therapeutic Exercise:  [x]  See flow sheet   Rationale:      increase ROM and increase strength to improve the patients ability to amb/ perform ADLs with less pain     10 min Neuromuscular Re-ed: [x]  See flow sheet   Rationale: improve coordination, improve balance and inc stability to improve the patients ability to amb/ perform ADLs with less pain     10 min Therapeutic Activity: [x]  See flow sheet   Rationale:      increase ROM, increase strength and improve coordination to improve the patients ability to amb on multi levels/ perform ADLs         Billed With/As:   [x] TE   [x] TA   [] Neuro   [] Self Care Patient Education: [x] Review HEP    [] Progressed/Changed HEP based on:   [] positioning   [] body mechanics   [] transfers   [] heat/ice application    [] other:        Other Objective/Functional Measures: Added ITB stretch    Modified HEP for emphasis on VMO   Post Treatment Pain Level (on 0 to 10) scale:   0  / 10     ASSESSMENT  Assessment/Changes in Function:      Functional improvement:  amb with improved confidence             Patient will continue to benefit from skilled PT services to modify and progress therapeutic interventions, address functional mobility deficits, address ROM deficits, address strength deficits, analyze and address soft tissue restrictions and analyze and cue movement patterns to attain remaining goals.    Progress toward goals / Updated goals:    Good Progress to    [x] STG    [] LTG  1 as shown by pt report         []  See Progress Note/Recertification    PLAN    [x]  Upgrade activities as tolerated {YES) Continue plan of care   []  Discharge due to :    []  Other:      Therapist: General Haleigh PT    Date: 5/5/2021 Time: 2:32 PM     Future Appointments   Date Time Provider Steffen Brumfield   5/7/2021  1:15 PM Tierra Holcomb PTA [facility]   5/11/2021  1:30 PM Sowmya Hearing [facility]   5/12/2021  3:30 PM Nathan Johnson PT [facility]   5/14/2021  9:30 AM TSS [facility] NURSE VISIT [facility] BS AMB   5/19/2021  1:00 PM Logan Dent PT [facility]   5/21/2021  1:30 PM Jaelyn Chakraborty [facility]   5/26/2021  2:00 PM Ivy Shipman MD [facility] BS AMB

## 2021-05-07 ENCOUNTER — HOSPITAL ENCOUNTER (OUTPATIENT)
Dept: PHYSICAL THERAPY | Age: 61
Discharge: HOME | End: 2021-05-07
Payer: MEDICAID

## 2021-05-07 PROCEDURE — 97530 THERAPEUTIC ACTIVITIES: CPT

## 2021-05-07 PROCEDURE — 97014 ELECTRIC STIMULATION THERAPY: CPT

## 2021-05-07 PROCEDURE — 97110 THERAPEUTIC EXERCISES: CPT

## 2021-05-07 PROCEDURE — 97112 NEUROMUSCULAR REEDUCATION: CPT

## 2021-05-07 NOTE — PROGRESS NOTES
PHYSICAL THERAPY - DAILY TREATMENT NOTE     Patient Name: Santana Reyes Sr.        Date: 2021  : 1960   YES Patient  Verified  Visit #:     of   12  Insurance: Payor: Rana Akosua / Plan: [facility] COMMUNITY PLAN [facility] CCCP / Product Type: Managed Care Medicaid /      In time: 118  Out time: 2:04    Total Treatment Time: 46     Medicare/BCBS Time Tracking (below)   Total Timed Codes (min):  - 1:1 Treatment Time:  -     TREATMENT AREA =  Pain in right knee [M25.561]    SUBJECTIVE    Pain Level (on 0 to 10 scale):  1/ 10   Medication Changes/New allergies or changes in medical history, any new surgeries or procedures? NO    If yes, update Summary List   Subjective Functional Status/Changes:  []  No changes reported       \"I feel it getting stronger.  I feel like I can walk of the stairs more confidently.:         OBJECTIVE  Modalities Rationale:     decrease edema, decrease inflammation and decrease pain to improve patient's ability to amb/ perform ADLs with less pain     8 min [x] Estim, type/location: NMES to R VMO with QS 10:20                                     []  att     [x]  unatt     []  w/US     []  w/ice    []  w/heat    min []  Mechanical Traction: type/lbs                   []  pro   []  sup   []  int   []  cont    []  before manual    []  after manual    min []  Ultrasound, settings/location:      min []  Iontophoresis w/ dexamethasone, location:                                               []  take home patch       []  in clinic   10 min [x]  Ice     []  Heat    location/position: Supine R knee LE elevated    min []  Vasopneumatic Device, press/temp:     min []  Other:    [x] Skin assessment post-treatment (if applicable):    [x]  intact    []  redness- no adverse reaction     []redness  adverse reaction:        110 min Therapeutic Exercise:  [x]  See flow sheet   Rationale:      increase ROM and increase strength to improve the patients ability to amb/ perform ADLs with less pain 9 min Neuromuscular Re-ed: [x]  See flow sheet   Rationale:      improve coordination, improve balance and inc stability to improve the patients ability to amb/ perform ADLs with less pain     8 min Therapeutic Activity: [x]  See flow sheet   Rationale:      increase ROM, increase strength and improve coordination to improve the patients ability to amb on multi levels/ perform ADLs         Billed With/As:   [x] TE   [x] TA   [] Neuro   [] Self Care Patient Education: [x] Review HEP    [] Progressed/Changed HEP based on:   [] positioning   [] body mechanics   [] transfers   [] heat/ice application    [] other:        Other Objective/Functional Measures: Add NMES with QS for R VMO recruitment    Post Treatment Pain Level (on 0 to 10) scale:    0 / 10     ASSESSMENT  Assessment/Changes in Function:      Functional improvement:  stair management with improved confidence  Mild fatigue with supine ITB stretch            Patient will continue to benefit from skilled PT services to modify and progress therapeutic interventions, address functional mobility deficits, address ROM deficits, address strength deficits, analyze and address soft tissue restrictions and analyze and cue movement patterns to attain remaining goals.    Progress toward goals / Updated goals:    Good Progress to    [x] STG    [] LTG  Decreasing pain levels in ADLs         []  See Progress Note/Recertification    PLAN    [x]  Upgrade activities as tolerated {YES) Continue plan of care   []  Discharge due to :    []  Other:      Therapist: Esthela Irizarry PTA    Date: 5/7/2021 Time: 204  PM     Future Appointments   Date Time Provider Steffen Brumfield   5/11/2021  1:30 PM Idella Pontes [facility]   5/12/2021  3:30 PM Gris Riggins PT [facility]   5/14/2021  9:30 AM TSS [facility] NURSE VISIT [facility] BS AMB   5/19/2021  1:00 PM Leslie Haas, PT [facility]   5/21/2021  1:30 PM Idella Pontes [facility]   5/26/2021  2:00 PM Maria De Jesus Moody MD [facility] BS AMB

## 2021-05-12 ENCOUNTER — HOSPITAL ENCOUNTER (OUTPATIENT)
Dept: PHYSICAL THERAPY | Age: 61
End: 2021-05-12
Payer: MEDICAID

## 2021-05-19 ENCOUNTER — HOSPITAL ENCOUNTER (OUTPATIENT)
Dept: PHYSICAL THERAPY | Age: 61
Discharge: HOME | End: 2021-05-19
Payer: MEDICAID

## 2021-05-19 PROCEDURE — 97112 NEUROMUSCULAR REEDUCATION: CPT

## 2021-05-19 PROCEDURE — 97014 ELECTRIC STIMULATION THERAPY: CPT

## 2021-05-19 PROCEDURE — 97110 THERAPEUTIC EXERCISES: CPT

## 2021-05-19 PROCEDURE — 97535 SELF CARE MNGMENT TRAINING: CPT

## 2021-05-19 NOTE — PROGRESS NOTES
[facility] PHYSICAL THERAPY AT [address] [facility], [address] [place] [address], [address], [address] [place] [address]  Phone: [phone]  Fax: [phone]  PROGRESS NOTE  Patient Name: Gabby Piedra Sr. : 1960   Treatment/Medical Diagnosis: Pain in right knee [M25.561]   Referral Source: Zena Mendez MD     Date of Initial Visit: 2021 Attended Visits: 6 Missed Visits: 1     SUMMARY OF TREATMENT  PT consisted of manual therapy techniques, therapeutic exercises, and modalities to improve strength, improve mobility, decrease pain, and improve overall function. CURRENT STATUS  Pt reports he is still having pain with stairs, walking, and squatting ADLs. Pt states he needs to wear his compression sleeves at all times or his knee feels like its going to give out. Pt showing decreased patellar tracking and superior sitting patella on the R. VMO activation has improved with NMES showing slight improvement in tracking. ROM is WFL. Pt demonstrates increased genu valgum with all weightbearing activities. Pain levels consistently between 1-3/10. Goal/Measure of Progress Goal Met? 1. Increase FOTO score to 59/100 indicating improved function   Status at last Eval: 43 Current Status: 43 no   2. Pt will demo hip strength >4+/5 for improved squat mechanics    Status at last Eval: 4/5 Current Status: 4/5 no   3. Pt will demo ability to performed a 90 degree squat with good form and no pain for retrieving floor level items   Status at last Eval: - Current Status: painful no   4. Pt will demo >15 sec SLS bilaterally for improved safety and stability with dressing ADL's and shower transfers   Status at last Eval: <15sec Current Status: <15sec no     5. Pt will report >50% improvement in overall symptoms for improved activity tolerance and QOL   Status at last Eval: - Current Status: - n/a       New Goals to be achieved in __4__  weeks:  1.   Continue unmet goals above   2.  -   3.  - 4.  -       RECOMMENDATIONS  Pt to continue PT 2x a week for 4-6 weeks to continue to improve R knee strength and stability needed for ADLs  If you have any questions/comments please contact us directly at [phone]. Thank you for allowing us to assist in the care of your patient. Therapist Signature: Karina Almonte Date: 5/19/2021     Time: 9:51 AM   NOTE TO PHYSICIAN:  PLEASE COMPLETE THE ORDERS BELOW AND FAX TO   [facility] at [address]: [phone]. If you are unable to process this request in 24 hours please contact our office: [phone].    ___ I have read the above report and request that my patient continue as recommended.   ___ I have read the above report and request that my patient continue therapy with the following changes/special instructions:_________________________________________________________   ___ I have read the above report and request that my patient be discharged from therapy.      Physician Signature:        Date:       Time:        Jayla Dockery MD

## 2021-05-19 NOTE — PROGRESS NOTES
PHYSICAL THERAPY - DAILY TREATMENT NOTE    Patient Name: Matthieu Randhawa Sr.        Date: 2021  : 1960    Patient  Verified: YES  Visit #:   6   of   12  Insurance: Payor: Chelsy Hull / Plan: [facility] COMMUNITY PLAN [facility] CCCP / Product Type: Managed Care Medicaid /      In time: 12:50 Out time: 1:50   Total Treatment Time: 60     Medicare Time Tracking (below)   Total Timed Codes (min):  - 1:1 Treatment Time:  -     TREATMENT AREA/ DIAGNOSIS = Pain in right knee [M25.561]    SUBJECTIVE  Pain Level (on 0 to 10 scale):  3  / 10   Medication Changes/New allergies or changes in medical history, any new surgeries or procedures?     NO    If yes, update Summary List   Subjective Functional Status/Changes:  []  No changes reported     See Pn      OBJECTIVE  Modalities Rationale:     decrease pain, increase tissue extensibility and increase muscle contraction/control to improve patient's ability to perform ADLs without pain    10 min [x] Estim, type/location: NMES to VMO on R knee                                     []  att     []  unatt     []  w/US     []  w/ice    []  w/heat    min []  Mechanical Traction: type/lbs                   []  pro   []  sup   []  int   []  cont    []  before manual    []  after manual    min []  Ultrasound, settings/location:      min []  Iontophoresis w/ dexamethasone, location:                                               []  take home patch       []  in clinic   10 min [x]  Ice     []  Heat    location/position: B knees    min []  Vasopneumatic Device, press/temp:     min []  Other:    [] Skin assessment post-treatment (if applicable):    []  intact    []  redness- no adverse reaction     []redness  adverse reaction:        15 min Therapeutic Exercise:  [x]  See flow sheet   Rationale:      increase ROM and increase strength to improve the patients ability to perform ADLs without pain       15 min Therapeutic Activity: [x]  See flow sheet   Rationale:    increase ROM, increase strength and improve coordination to improve the patients ability to perform ADLs without pain      10 min Self Care: HEP education. Brace education. Rationale:    education to improve the patients ability to self manage symptoms     Billed With/As:   [x] TE   [] TA   [] Neuro   [] Self Care Patient Education: [x] Review HEP    [] Progressed/Changed HEP based on:   [] positioning   [] body mechanics   [] transfers   [] heat/ice application    [] other:        Other Objective/Functional Measures:    See PN   Post Treatment Pain Level (on 0 to 10) scale:   0  / 10     ASSESSMENT  Assessment/Changes in Function:     See PN     []  See Progress Note/Recertification   Patient will continue to benefit from skilled PT services to modify and progress therapeutic interventions, address functional mobility deficits, address ROM deficits, address strength deficits, analyze and address soft tissue restrictions and analyze and cue movement patterns to attain remaining goals.    Progress toward goals / Updated goals:    See PN     PLAN  [x]  Upgrade activities as tolerated YES Continue plan of care   []  Discharge due to :    []  Other:      Therapist: Charbel Haynes DPT     Date: 5/19/2021 Time: 9:51 AM        Future Appointments   Date Time Provider Steffen Brumfield   5/19/2021  1:00 PM [place] [id number] [facility]   5/21/2021  1:30 PM [address]   5/26/2021  2:00 PM Demond Muñoz MD [facility] BS AMB   9/14/2021  3:00 PM [facility]

## 2021-05-21 ENCOUNTER — HOSPITAL ENCOUNTER (OUTPATIENT)
Dept: PHYSICAL THERAPY | Age: 61
Discharge: HOME | End: 2021-05-21
Payer: MEDICAID

## 2021-05-21 PROCEDURE — 97530 THERAPEUTIC ACTIVITIES: CPT

## 2021-05-21 PROCEDURE — 97110 THERAPEUTIC EXERCISES: CPT

## 2021-05-21 PROCEDURE — 97014 ELECTRIC STIMULATION THERAPY: CPT

## 2021-05-21 PROCEDURE — 97112 NEUROMUSCULAR REEDUCATION: CPT

## 2021-05-21 NOTE — PROGRESS NOTES
PHYSICAL THERAPY - DAILY TREATMENT NOTE    Patient Name: Mari Rodriguez Sr.        Date: 2021  : 1960    Patient  Verified: YES  Visit #:      12  Insurance: Payor: Katie Castro / Plan: [facility] COMMUNITY PLAN [facility] CCCP / Product Type: Managed Care Medicaid /      In time: 1:30 Out time: 2:30   Total Treatment Time: 60     Medicare Time Tracking (below)   Total Timed Codes (min):  - 1:1 Treatment Time:  -     TREATMENT AREA/ DIAGNOSIS = Pain in right knee [M25.561]    SUBJECTIVE  Pain Level (on 0 to 10 scale):  1  / 10   Medication Changes/New allergies or changes in medical history, any new surgeries or procedures? NO    If yes, update Summary List   Subjective Functional Status/Changes:  []  No changes reported     Pt reports his knee is feeling better.  Still has pain when he doesn't wear his sleeve on his knee      OBJECTIVE  Modalities Rationale:     decrease inflammation, decrease pain and increase muscle contraction/control to improve patient's ability to perform ADLs without pain    10 min [x] Estim, type/location: VMO NMES                                     []  att     []  unatt     []  w/US     []  w/ice    []  w/heat    min []  Mechanical Traction: type/lbs                   []  pro   []  sup   []  int   []  cont    []  before manual    []  after manual    min []  Ultrasound, settings/location:      min []  Iontophoresis w/ dexamethasone, location:                                               []  take home patch       []  in clinic    min []  Ice     []  Heat    location/position:     min []  Vasopneumatic Device, press/temp:     min []  Other:    [] Skin assessment post-treatment (if applicable):    []  intact    []  redness- no adverse reaction     []redness  adverse reaction:        20 min Therapeutic Exercise:  [x]  See flow sheet   Rationale:      increase ROM and increase strength to improve the patients ability to perform ADLs without pain       20 min Therapeutic Activity: [x]  See flow sheet   Rationale:    increase strength and improve coordination to improve the patients ability to perform ADLs without pain      10 min Neuromuscular Re-ed: [x]  See flow sheet   Rationale:    improve balance and increase proprioception to improve the patients ability to perform ADLs without pain      Billed With/As:   [x] TE   [] TA   [] Neuro   [] Self Care Patient Education: [x] Review HEP    [] Progressed/Changed HEP based on:   [] positioning   [] body mechanics   [] transfers   [] heat/ice application    [] other:        Other Objective/Functional Measures:    Increased genu valgum in the knees. Decreased stability with SLS   Post Treatment Pain Level (on 0 to 10) scale:   0  / 10     ASSESSMENT  Assessment/Changes in Function:     Improved overall quad control with exercises      []  See Progress Note/Recertification   Patient will continue to benefit from skilled PT services to modify and progress therapeutic interventions, address functional mobility deficits, address ROM deficits, address strength deficits, analyze and address soft tissue restrictions and analyze and cue movement patterns to attain remaining goals.    Progress toward goals / Updated goals:    Fair Progress to    [] STG    [x] LTG  1 as shown by decreased knee subluxations with ADLs     PLAN  [x]  Upgrade activities as tolerated YES Continue plan of care   []  Discharge due to :    []  Other:      Therapist: Terry Matamoros DPT     Date: 5/21/2021 Time: 1:01 PM        Future Appointments   Date Time Provider Steffen Brumfield   5/21/2021  1:30 PM Renne Morse [facility]   5/26/2021 11:30 AM Karl Cleveland PT [facility]   5/26/2021  2:00 PM Lili Fields MD [facility]   5/27/2021 11:45 AM Flaco Dunham PT [facility]   6/2/2021  3:45 PM Renne Morse [facility]   6/4/2021  2:15 PM Renne Morse [facility]   6/9/2021  3:00 PM Chano Meza [facility]   6/11/2021  2:15 PM Hamilton Livingston [facility]   6/16/2021  3:45 PM Nonah Pall [facility]   6/18/2021  2:00 PM Rogelio Sethi, PTA [facility]   6/23/2021  3:30 PM Bhupendra Pereyra, PT [facility]   6/25/2021  2:15 PM Nonah Pall [facility]   9/14/2021  3:00 PM [facility] NURSE VISIT [facility] AMB

## 2021-05-26 ENCOUNTER — OFFICE VISIT (OUTPATIENT)
Dept: NEUROLOGY | Age: 61
End: 2021-05-26

## 2021-05-26 VITALS
SYSTOLIC BLOOD PRESSURE: 130 MMHG | WEIGHT: 241.6 LBS | BODY MASS INDEX: 33.82 KG/M2 | DIASTOLIC BLOOD PRESSURE: 72 MMHG | HEIGHT: 71 IN | OXYGEN SATURATION: 96 % | TEMPERATURE: 96.9 F | RESPIRATION RATE: 22 BRPM | HEART RATE: 64 BPM

## 2021-05-26 DIAGNOSIS — G56.03 BILATERAL CARPAL TUNNEL SYNDROME: Primary | ICD-10-CM

## 2021-05-26 DIAGNOSIS — S54.00XA: ICD-10-CM

## 2021-05-26 NOTE — PROGRESS NOTES
Test Date:  2021    Patient: Sussy Irvin : 1960 Physician: Nelly Zhang   Sex: Male Height:  cm Ref Phys: Nelly Zhang   ID#: [id number] Weight:  lbs. Technician: Thaddeus Mcdowell     Patient Complaints/History:  A 56years old male patient here for evaluation of right hand tingling/pins-and-needles sensation and numbness of 1 and half years duration. Wakes him up at night and tends to shake his hand. Progressively getting worse. Claims his strength is good. He had injury to the the left forearm with a cat from a glass while at work years ago. Flexion deformity of the left fifth finger with muscle atrophy. Physical exam:  Motor:; Right upper extremity strength is 5/5; left upper extremity showed atrophy of intrinsic hand muscles with significant atrophy of the FDI; flexion deformity of the fifth finger;  strength is 4+/5. DTR: 2+ over both upper extrem and ities. Sensory: On the right side, intact light touch and pinprick; on the left side decreased light touch and pinprick in the ulnar nerve distribution. Negative Tinel and Phalen. NCV & EMG Findings:  1. Evaluation of the left median motor nerve showed prolonged distal onset latency (5.3 ms) and reduced amplitude (4.6 mV). 2.  Evaluation of; normal amplitude and conduction velocity the right median motor nerve showed prolonged distal onset latency (6.0 ms). 3. The left ulnar motor nerve showed reduced amplitude (1.1 mV); normal distal latency; no response at above and below elbow stimulation. 4. The right ulnar motor nerve showed normal distal latency, amplitude; slightly decreased conduction velocity. 5. The left median sensory nerve showed prolonged distal peak latency (4.8 ms) and decreased conduction velocity (Wrist-2nd Digit, 29 m/s). 6.  The right median sensory nerve response with artifactual.  7. The left ulnar sensory nerve showed no response.    8. The right ulnar sensory nerve showed prolonged distal peak latency (5.5 ms) and decreased conduction velocity (Wrist-5th Digit, 25 m/s). Impression:  The electrophysiologic history of the upper extremity nerves is suggestive of  1. Bilateral  carpal tunnel syndrome left more than the right. 2.  Left ulnar neuropathy at the forearm injury site. Recommendations:  Bilateral wrist splint. Needs referral to hand surgery.     ___________________________  Cornell GLEASON        Nerve Conduction Studies  Anti Sensory Summary Table     Stim Site NR Peak (ms) Norm Peak (ms) P-T Amp (µV) Norm P-T Amp Site1 Site2 Delta-P (ms) Dist (cm) Andrea (m/s) Norm Andrea (m/s)   Left Median Anti Sensory (2nd Digit)   Wrist    4.8 <3.6 26.0 >10 Wrist 2nd Digit 4.8 14.0 29 >39   Elbow    4.8  34.8  Elbow Wrist 0.0 0.0  >48   Right Median Anti Sensory (2nd Digit)   Wrist    NR  17.4  Axilla Elbow 0.2 0.0     Site 2      18.9          Left Ulnar Anti Sensory (5th Digit)   Wrist    10.2 <3.7 8.4 >15.0 Wrist 5th Digit 10.2 14.0 14 >38   B Elbow    5.1  13.7  B Elbow Wrist 5.1 0.0  >47   Right Ulnar Anti Sensory (5th Digit)   Wrist    5.5 <3.7 68.6 >15.0 Wrist 5th Digit 5.5 14.0 25 >38   B Elbow    5.6  58.8  B Elbow Wrist 0.1 0.0  >47     Motor Summary Table     Stim Site NR Onset (ms) Norm Onset (ms) O-P Amp (mV) Norm O-P Amp Site1 Site2 Delta-0 (ms) Dist (cm) Andrea (m/s) Norm Andrea (m/s)   Left Median Motor (Abd Poll Brev)   Wrist    5.3 <4.2 4.6 >5 Elbow Wrist 4.8 24.0 50 >50   Elbow    10.1  4.6          Right Median Motor (Abd Poll Brev)   Wrist    6.0 <4.2 5.0 >5 Elbow Wrist 4.7 25.0 53 >50   Elbow    10.7  4.6          Left Ulnar Motor (Abd Dig Min)   Wrist    3.7 <4.2 1.1 >3 B Elbow Wrist 10.1 25.0 25 >53   B Elbow    13.8  0.2          Right Ulnar Motor (Abd Dig Min)   Wrist    2.8 <4.2 3.2 >3 B Elbow Wrist 6.3 27.0 43 >53   B Elbow    9.1  3.2  A Elbow B Elbow 1.2 10.0 83 >53   A Elbow    10.3  3.2  Axilla A Elbow 3.5 0.0     Axilla    13.8  0.1              Nerve Conduction Studies  Anti Sensory Left/Right Comparison     Stim Site L Lat (ms) R Lat (ms) L-R Lat (ms) L Amp (µV) R Amp (µV) L-R Amp (%) Site1 Site2 L Andrea (m/s) R Andrea (m/s) L-R Andrea (m/s)   Median Anti Sensory (2nd Digit)   Wrist 4.8   26.0   Wrist 2nd Digit 29     Elbow 4.8 2.0 2.8 34.8 17.4 50.0 Elbow Wrist      Ulnar Anti Sensory (5th Digit)   Wrist 10.2 5.5 4.7 8.4 68.6 87.8 Wrist 5th Digit 14 25 11   B Elbow 5.1 5.6 0.5 13.7 58.8 76.7 B Elbow Wrist        Motor Left/Right Comparison     Stim Site L Lat (ms) R Lat (ms) L-R Lat (ms) L Amp (mV) R Amp (mV) L-R Amp (%) Site1 Site2 L Andrea (m/s) R Andrea (m/s) L-R Andrea (m/s)   Median Motor (Abd Poll Brev)   Wrist 5.3 6.0 0.7 4.6 5.0 8.0 Elbow Wrist 50 53 3   Elbow 10.1 10.7 0.6 4.6 4.6 0.0        Ulnar Motor (Abd Dig Min)   Wrist 3.7 2.8 0.9 1.1 3.2 65.6 B Elbow Wrist 25 43 18   B Elbow 13.8 9.1 4.7 0.2 3.2 93.8              Waveforms:                                               Chart reviewed for the following:               Jairo Tolliver MD, have reviewed the History, Physical and updated the Allergy history of  patient       I  have performed the following reviews on prior to the start of the procedure:     * Patient was identified by name and date of birth   * Agreement on procedure being performed was verified  * Risks and Benefits explained to the patient  * Procedure site verified and marked as necessary  * Patient was positioned for comfort  * Consent was signed and verified     Time: 2:37 PM     Date of procedure: 5/26/2021     Procedure performed by:  PROCEDURE ROOM     Provider assisted by: Alex Lackey     Patient assisted by: self     How tolerated by patient: tolerated the procedure well.

## 2021-05-28 NOTE — PROCEDURES
Test Date:  2021    Patient: Heaven Parkinson : 1960 Physician: Glen Butterfield   Sex: Male Height:  cm Ref Phys: Glen Butterfield   ID#: [id number] Weight:  lbs. Technician: Meredith Cornejo     Patient Complaints/History:  A 56years old male patient here for evaluation of right hand tingling/pins-and-needles sensation and numbness of 1 and half years duration. Wakes him up at night and tends to shake his hand. Progressively getting worse. Claims his strength is good. He had injury to the the left forearm with a cat from a glass while at work years ago. Flexion deformity of the left fifth finger with muscle atrophy. Physical exam:  Motor:; Right upper extremity strength is 5/5; left upper extremity showed atrophy of intrinsic hand muscles with significant atrophy of the FDI; flexion deformity of the fifth finger;  strength is 4+/5. DTR: 2+ over both upper extrem and ities. Sensory: On the right side, intact light touch and pinprick; on the left side decreased light touch and pinprick in the ulnar nerve distribution. Negative Tinel and Phalen. NCV & EMG Findings:  1. Evaluation of the left median motor nerve showed prolonged distal onset latency (5.3 ms) and reduced amplitude (4.6 mV). 2.  Evaluation of; normal amplitude and conduction velocity the right median motor nerve showed prolonged distal onset latency (6.0 ms). 3. The left ulnar motor nerve showed reduced amplitude (1.1 mV); normal distal latency; no response at above and below elbow stimulation. 4. The right ulnar motor nerve showed normal distal latency, amplitude; slightly decreased conduction velocity. 5. The left median sensory nerve showed prolonged distal peak latency (4.8 ms) and decreased conduction velocity (Wrist-2nd Digit, 29 m/s). 6.  The right median sensory nerve response with artifactual.  7. The left ulnar sensory nerve showed no response.    8. The right ulnar sensory nerve showed prolonged distal peak latency (5.5 ms) and decreased conduction velocity (Wrist-5th Digit, 25 m/s). Impression:  The electrophysiologic history of the upper extremity nerves is suggestive of  1. Bilateral  carpal tunnel syndrome left more than the right. 2.  Left ulnar neuropathy at the forearm injury site. Recommendations:  Bilateral wrist splint. Needs referral to hand surgery.     ___________________________  Nathen Snellen D        Nerve Conduction Studies  Anti Sensory Summary Table     Stim Site NR Peak (ms) Norm Peak (ms) P-T Amp (µV) Norm P-T Amp Site1 Site2 Delta-P (ms) Dist (cm) Andrea (m/s) Norm Andrea (m/s)   Left Median Anti Sensory (2nd Digit)   Wrist    4.8 <3.6 26.0 >10 Wrist 2nd Digit 4.8 14.0 29 >39   Elbow    4.8  34.8  Elbow Wrist 0.0 0.0  >48   Right Median Anti Sensory (2nd Digit)   Wrist    NR  17.4  Axilla Elbow 0.2 0.0     Site 2      18.9          Left Ulnar Anti Sensory (5th Digit)   Wrist    10.2 <3.7 8.4 >15.0 Wrist 5th Digit 10.2 14.0 14 >38   B Elbow    5.1  13.7  B Elbow Wrist 5.1 0.0  >47   Right Ulnar Anti Sensory (5th Digit)   Wrist    5.5 <3.7 68.6 >15.0 Wrist 5th Digit 5.5 14.0 25 >38   B Elbow    5.6  58.8  B Elbow Wrist 0.1 0.0  >47     Motor Summary Table     Stim Site NR Onset (ms) Norm Onset (ms) O-P Amp (mV) Norm O-P Amp Site1 Site2 Delta-0 (ms) Dist (cm) Andrea (m/s) Norm Andrea (m/s)   Left Median Motor (Abd Poll Brev)   Wrist    5.3 <4.2 4.6 >5 Elbow Wrist 4.8 24.0 50 >50   Elbow    10.1  4.6          Right Median Motor (Abd Poll Brev)   Wrist    6.0 <4.2 5.0 >5 Elbow Wrist 4.7 25.0 53 >50   Elbow    10.7  4.6          Left Ulnar Motor (Abd Dig Min)   Wrist    3.7 <4.2 1.1 >3 B Elbow Wrist 10.1 25.0 25 >53   B Elbow    13.8  0.2          Right Ulnar Motor (Abd Dig Min)   Wrist    2.8 <4.2 3.2 >3 B Elbow Wrist 6.3 27.0 43 >53   B Elbow    9.1  3.2  A Elbow B Elbow 1.2 10.0 83 >53   A Elbow    10.3  3.2  Axilla A Elbow 3.5 0.0     Axilla    13.8  0.1              Nerve Conduction Studies  Anti Sensory Left/Right Comparison     Stim Site L Lat (ms) R Lat (ms) L-R Lat (ms) L Amp (µV) R Amp (µV) L-R Amp (%) Site1 Site2 L Andrea (m/s) R Andrea (m/s) L-R Andrea (m/s)   Median Anti Sensory (2nd Digit)   Wrist 4.8   26.0   Wrist 2nd Digit 29     Elbow 4.8 2.0 2.8 34.8 17.4 50.0 Elbow Wrist      Ulnar Anti Sensory (5th Digit)   Wrist 10.2 5.5 4.7 8.4 68.6 87.8 Wrist 5th Digit 14 25 11   B Elbow 5.1 5.6 0.5 13.7 58.8 76.7 B Elbow Wrist        Motor Left/Right Comparison     Stim Site L Lat (ms) R Lat (ms) L-R Lat (ms) L Amp (mV) R Amp (mV) L-R Amp (%) Site1 Site2 L Andrea (m/s) R Andrea (m/s) L-R Andrea (m/s)   Median Motor (Abd Poll Brev)   Wrist 5.3 6.0 0.7 4.6 5.0 8.0 Elbow Wrist 50 53 3   Elbow 10.1 10.7 0.6 4.6 4.6 0.0        Ulnar Motor (Abd Dig Min)   Wrist 3.7 2.8 0.9 1.1 3.2 65.6 B Elbow Wrist 25 43 18   B Elbow 13.8 9.1 4.7 0.2 3.2 93.8              Waveforms:                                               Chart reviewed for the following:               Noble Correa MD, have reviewed the History, Physical and updated the Allergy history of  patient       I  have performed the following reviews on prior to the start of the procedure:     * Patient was identified by name and date of birth   * Agreement on procedure being performed was verified  * Risks and Benefits explained to the patient  * Procedure site verified and marked as necessary  * Patient was positioned for comfort  * Consent was signed and verified     Time: 2:37 PM     Date of procedure: 5/26/2021     Procedure performed by:  PROCEDURE ROOM     Provider assisted by: Elizabeth Maya     Patient assisted by: self     How tolerated by patient: tolerated the procedure well.

## 2021-06-02 ENCOUNTER — APPOINTMENT (OUTPATIENT)
Dept: PHYSICAL THERAPY | Age: 61
End: 2021-06-02
Payer: MEDICAID

## 2021-06-04 ENCOUNTER — HOSPITAL ENCOUNTER (OUTPATIENT)
Dept: PHYSICAL THERAPY | Age: 61
Discharge: HOME | End: 2021-06-04
Payer: MEDICAID

## 2021-06-04 PROCEDURE — 97530 THERAPEUTIC ACTIVITIES: CPT

## 2021-06-04 PROCEDURE — 97110 THERAPEUTIC EXERCISES: CPT

## 2021-06-04 PROCEDURE — 97112 NEUROMUSCULAR REEDUCATION: CPT

## 2021-06-04 PROCEDURE — 97014 ELECTRIC STIMULATION THERAPY: CPT

## 2021-06-04 NOTE — PROGRESS NOTES
PHYSICAL THERAPY - DAILY TREATMENT NOTE    Patient Name: Lisha Chakraborty Sr.        Date: 2021  : 1960    Patient  Verified: YES  Visit #:   8   of   12  Insurance: Payor: Sarita Rather / Plan: [facility] COMMUNITY PLAN [facility] CCCP / Product Type: Managed Care Medicaid /      In time: 2:15 Out time: 3:10   Total Treatment Time: 55     Medicare Time Tracking (below)   Total Timed Codes (min):  - 1:1 Treatment Time:  -     TREATMENT AREA/ DIAGNOSIS = Pain in right knee [M25.561]    SUBJECTIVE  Pain Level (on 0 to 10 scale):  0  / 10   Medication Changes/New allergies or changes in medical history, any new surgeries or procedures? NO    If yes, update Summary List   Subjective Functional Status/Changes:  []  No changes reported     Pt reports little pain in the R knee. All the pain is in the L knee.  R knee feels much stronger       OBJECTIVE  Modalities Rationale:     decrease inflammation and decrease pain to improve patient's ability to perform ADLs without pain    10 min [x] Estim, type/location: [place] to R VMO                                    []  att     []  unatt     []  w/US     []  w/ice    []  w/heat    min []  Mechanical Traction: type/lbs                   []  pro   []  sup   []  int   []  cont    []  before manual    []  after manual    min []  Ultrasound, settings/location:      min []  Iontophoresis w/ dexamethasone, location:                                               []  take home patch       []  in clinic   10 min []  Ice     []  Heat    location/position:     min []  Vasopneumatic Device, press/temp:     min []  Other:    [] Skin assessment post-treatment (if applicable):    []  intact    []  redness- no adverse reaction     []redness  adverse reaction:        15 min Therapeutic Exercise:  [x]  See flow sheet   Rationale:      increase ROM and increase strength to improve the patients ability to perform ADLs without pain       10 min Therapeutic Activity: [x]  See flow sheet Rationale:    functional activities  to improve the patients ability to perform ADLs without pain      10 min Neuromuscular Re-ed: [x]  See flow sheet   Rationale:    increase ROM and increase strength to improve the patients ability to perform ADLs without pain      Billed With/As:   [x] TE   [] TA   [] Neuro   [] Self Care Patient Education: [x] Review HEP    [] Progressed/Changed HEP based on:   [] positioning   [] body mechanics   [] transfers   [] heat/ice application    [] other:        Other Objective/Functional Measures:    Quad set significantly improved sing onset. VMO showing improved contractile ability. Post Treatment Pain Level (on 0 to 10) scale:   0  / 10     ASSESSMENT  Assessment/Changes in Function:     Pt showing improved valgus control with squatting and step up activities      []  See Progress Note/Recertification   Patient will continue to benefit from skilled PT services to modify and progress therapeutic interventions, address functional mobility deficits, address ROM deficits, address strength deficits, analyze and address soft tissue restrictions and analyze and cue movement patterns to attain remaining goals.    Progress toward goals / Updated goals:    Good Progress to    [] STG    [x] LTG  1 as shown by improved R knee pain with all ADLs and less reported feeling of it giving out     PLAN  [x]  Upgrade activities as tolerated YES Continue plan of care   []  Discharge due to :    []  Other:      Therapist: Jeffrey Gong DPT     Date: 6/4/2021 Time: 11:55 AM        Future Appointments   Date Time Provider Steffen Brumfield   6/4/2021  2:15 PM Leni Hardy [facility]   6/9/2021  3:00 PM Leni Hardy [facility]   6/11/2021  2:15 PM Leni Hardy [facility]   6/16/2021  3:45 PM Leni Hardy [facility]   6/18/2021  2:00 PM Theadore Libman, PTA [facility]   6/23/2021  3:30 PM Cesar Ricardo PT [facility]   6/25/2021  2:15 PM Cierra Cabral [facility]   9/14/2021  3:00 PM [facility] NURSE VISIT TYSHAWN GUIDO

## 2021-06-09 ENCOUNTER — APPOINTMENT (OUTPATIENT)
Dept: PHYSICAL THERAPY | Age: 61
End: 2021-06-09
Payer: MEDICAID

## 2021-06-11 ENCOUNTER — HOSPITAL ENCOUNTER (OUTPATIENT)
Dept: PHYSICAL THERAPY | Age: 61
Discharge: HOME | End: 2021-06-11
Payer: MEDICAID

## 2021-06-11 PROCEDURE — 97110 THERAPEUTIC EXERCISES: CPT

## 2021-06-11 PROCEDURE — 97535 SELF CARE MNGMENT TRAINING: CPT

## 2021-06-11 PROCEDURE — 97014 ELECTRIC STIMULATION THERAPY: CPT

## 2021-06-11 NOTE — PROGRESS NOTES
PHYSICAL THERAPY - DAILY TREATMENT NOTE    Patient Name: Tony Nunes Sr.        Date: 2021  : 1960    Patient  Verified: YES  Visit #:     Insurance: Payor: Alfredo Berman / Plan: [facility] COMMUNITY PLAN [facility] CCCP / Product Type: Managed Care Medicaid /      In time: 2:15 Out time: 2:55   Total Treatment Time: 40     Medicare Time Tracking (below)   Total Timed Codes (min):  - 1:1 Treatment Time:  -     TREATMENT AREA/ DIAGNOSIS = Pain in right knee [M25.561]    SUBJECTIVE  Pain Level (on 0 to 10 scale):  0  / 10   Medication Changes/New allergies or changes in medical history, any new surgeries or procedures? NO    If yes, update Summary List   Subjective Functional Status/Changes:  []  No changes reported     Pt reports no pain right now in the R knee. Pt states the pain is in the L knee and it is giving out on him. Pt states he is going to see the doc soon for the L knee.       OBJECTIVE  Modalities Rationale:     decrease inflammation, decrease pain and increase muscle contraction/control to improve patient's ability to perform ADLs without pain    10 min [x] Estim, type/location: NMES to R VMO                                     []  att     []  unatt     []  w/US     []  w/ice    []  w/heat    min []  Mechanical Traction: type/lbs                   []  pro   []  sup   []  int   []  cont    []  before manual    []  after manual    min []  Ultrasound, settings/location:      min []  Iontophoresis w/ dexamethasone, location:                                               []  take home patch       []  in clinic   10 min [x]  Ice     []  Heat    location/position: R knee    min []  Vasopneumatic Device, press/temp:     min []  Other:    [] Skin assessment post-treatment (if applicable):    []  intact    []  redness- no adverse reaction     []redness  adverse reaction:        10 min Therapeutic Exercise:  [x]  See flow sheet   Rationale:      increase ROM and increase strength to improve the patients ability to perform ADLs without pain       10 min Self Care: Education on consistent ice use. Activity modification for pain   Rationale:    education to improve the patients ability to self manage symptoms     Billed With/As:   [x] TE   [] TA   [] Neuro   [] Self Care Patient Education: [x] Review HEP    [] Progressed/Changed HEP based on:   [] positioning   [] body mechanics   [] transfers   [] heat/ice application    [] other:        Other Objective/Functional Measures:    Fair to good quad activation after NMES. Post Treatment Pain Level (on 0 to 10) scale:   0  / 10     ASSESSMENT  Assessment/Changes in Function:     Pt showing improved overall activity tolerance with RLE. Unable to perform closed chain exercises today due to significant L knee pain      []  See Progress Note/Recertification   Patient will continue to benefit from skilled PT services to modify and progress therapeutic interventions, address functional mobility deficits, address ROM deficits, address strength deficits, analyze and address soft tissue restrictions and analyze and cue movement patterns to attain remaining goals.    Progress toward goals / Updated goals:    Good Progress to    [] STG    [x] LTG  1 as shown by improved ADL ability with R knee pain levels      PLAN  [x]  Upgrade activities as tolerated YES Continue plan of care   []  Discharge due to :    []  Other:      Therapist: Minesh Bustos DPT     Date: 6/11/2021 Time: 11:50 AM        Future Appointments   Date Time Provider Steffen Brumfield   6/11/2021  2:15 PM Aris Galea [facility]   6/16/2021  3:45 PM Aris Galea [facility]   6/18/2021  2:00 PM Mabel Ramirez PTA [facility]   6/23/2021  3:00 PM Jarek Fuentes PT [facility]   6/25/2021  2:15 PM Aris Galea [facility]   6/30/2021  3:00 PM Aris Galea [facility]   9/14/2021  3:00 PM TSS [facility] NURSE VISIT [facility] BS AMB

## 2021-06-16 ENCOUNTER — HOSPITAL ENCOUNTER (OUTPATIENT)
Dept: PHYSICAL THERAPY | Age: 61
End: 2021-06-16
Payer: MEDICAID

## 2021-06-18 ENCOUNTER — HOSPITAL ENCOUNTER (OUTPATIENT)
Dept: PHYSICAL THERAPY | Age: 61
Discharge: HOME | End: 2021-06-18
Payer: MEDICAID

## 2021-06-18 PROCEDURE — 97014 ELECTRIC STIMULATION THERAPY: CPT

## 2021-06-18 PROCEDURE — 97110 THERAPEUTIC EXERCISES: CPT

## 2021-06-18 PROCEDURE — 97530 THERAPEUTIC ACTIVITIES: CPT

## 2021-06-18 NOTE — PROGRESS NOTES
[address] [facility] PHYSICAL THERAPY AT [address] [facility], [address] [place] [address], [address], [address] [place] [address]  Phone: [phone]  Fax: [phone]  PROGRESS NOTE  Patient Name: Caitlin Gaxiola Sr. : 1960   Treatment/Medical Diagnosis: Pain in right knee [M25.561]   Referral Source: Marky Olivares MD     Date of Initial Visit: 2021 Attended Visits: 10 Missed Visits: 6     SUMMARY OF TREATMENT  PT consisted of manual therapy techniques, therapeutic exercises, and modalities to improve strength, improve mobility, decrease pain, and improve overall function. CURRENT STATUS  Pt showing improved overall progress with R knee. Pt able to perform all ADLs using the R knee. Pt states he is having the most difficulty due to his L knee hurting too much to perform ADLs. Pt relying on the RLE to perform ADLs. AROM WNL. R quad set good now. Pt showing improved patellar tracking. Pt still using sleeves on B lower extremities for all ADLs     Goal/Measure of Progress Goal Met? 1. Increase FOTO score to 59/100 indicating improved function   Status at last Eval: 43 Current Status: 47 no   2. Pt will demo hip strength >4+/5 for improved squat mechanics    Status at last Eval: 4/5 Current Status: 4+/5 yes   3. Pt will demo ability to performed a 90 degree squat with good form and no pain for retrieving floor level items   Status at last Eval: - Current Status: painful no   4.   Pt will demo >15 sec SLS bilaterally for improved safety and stability with dressing ADL's and shower transfers   Status at last Eval: <15sec Current Status: <15sec no         New Goals to be achieved in __2__  weeks:  1. Continue unmet goals above   2.  -   3.  -   4.  -       RECOMMENDATIONS  Pt to continue PT 1-2 more weeks 1-2x a week in preparation for discharge and pt to follow up with physician in regards to his L knee which is currently his most limiting factor in ADL ability.    If you have any questions/comments please contact us directly at [phone]. Thank you for allowing us to assist in the care of your patient. Therapist Signature: Ken Trejo Date: 6/18/2021     Time: 3:01 PM   NOTE TO PHYSICIAN:  PLEASE COMPLETE THE ORDERS BELOW AND FAX TO   [facility] at [address]: [phone]. If you are unable to process this request in 24 hours please contact our office: [phone].    ___ I have read the above report and request that my patient continue as recommended.   ___ I have read the above report and request that my patient continue therapy with the following changes/special instructions:_________________________________________________________   ___ I have read the above report and request that my patient be discharged from therapy.      Physician Signature:        Date:       Time:        Roberto Lyman MD

## 2021-06-18 NOTE — PROGRESS NOTES
HYSICAL THERAPY - DAILY TREATMENT NOTE    Patient Name: Bonilla Whitt Sr.        Date: 2021  : 1960    Patient  Verified: YES  Visit #:   70   of   12  Insurance: Payor: Sunitha Garnett / Plan: [facility] COMMUNITY PLAN [facility] CCCP / Product Type: Managed Care Medicaid /      In time: 2:08 Out time: 3:00   Total Treatment Time: 52     Medicare Time Tracking (below)   Total Timed Codes (min):  - 1:1 Treatment Time:  -     TREATMENT AREA/ DIAGNOSIS = Pain in right knee [M25.561]    SUBJECTIVE  Pain Level (on 0 to 10 scale):  0  / 10   Medication Changes/New allergies or changes in medical history, any new surgeries or procedures?     NO    If yes, update Summary List   Subjective Functional Status/Changes:  []  No changes reported     See PN      OBJECTIVE  Modalities Rationale:     decrease inflammation and decrease pain to improve patient's ability to perform ADLs without pain    10 min [] Estim, type/location:                                      []  att     []  unatt     []  w/US     []  w/ice    []  w/heat    min []  Mechanical Traction: type/lbs                   []  pro   []  sup   []  int   []  cont    []  before manual    []  after manual    min []  Ultrasound, settings/location:      min []  Iontophoresis w/ dexamethasone, location:                                               []  take home patch       []  in clinic   10 min [x]  Ice     []  Heat    location/position: B knee    min []  Vasopneumatic Device, press/temp:     min []  Other:    [] Skin assessment post-treatment (if applicable):    []  intact    []  redness- no adverse reaction     []redness  adverse reaction:        22 min Therapeutic Exercise:  [x]  See flow sheet   Rationale:      increase ROM and increase strength to improve the patients ability to perform ADLs without pain     10 min Therapeutic Activity: [x]  See flow sheet   Rationale:    functional activities  to improve the patients ability to perform ADLs without pain      Billed With/As:   [x] TE   [] TA   [] Neuro   [] Self Care Patient Education: [x] Review HEP    [] Progressed/Changed HEP based on:   [] positioning   [] body mechanics   [] transfers   [] heat/ice application    [] other:        Other Objective/Functional Measures:    See PN   Post Treatment Pain Level (on 0 to 10) scale:   0  / 10     ASSESSMENT  Assessment/Changes in Function:     See PN     []  See Progress Note/Recertification   Patient will continue to benefit from skilled PT services to modify and progress therapeutic interventions, address functional mobility deficits, address ROM deficits, address strength deficits and analyze and cue movement patterns to attain remaining goals.    Progress toward goals / Updated goals:    See PN     PLAN  [x]  Upgrade activities as tolerated YES Continue plan of care   []  Discharge due to :    []  Other:      Therapist: Jeana Chiang DPT     Date: 6/18/2021 Time: 3:02 PM        Future Appointments   Date Time Provider Steffen Brumfield   6/23/2021  3:00 PM Ramona Donnelly, PT [facility]   6/25/2021  2:15 PM Tonette Sieving [facility]   6/30/2021  3:00 PM Tonette Sieving [facility]   9/14/2021  3:00 PM [facility] NURSE VISIT [facility] BS AMB

## 2021-06-23 ENCOUNTER — HOSPITAL ENCOUNTER (OUTPATIENT)
Dept: PHYSICAL THERAPY | Age: 61
End: 2021-06-23
Payer: MEDICAID

## 2021-06-25 ENCOUNTER — HOSPITAL ENCOUNTER (OUTPATIENT)
Dept: PHYSICAL THERAPY | Age: 61
Discharge: HOME | End: 2021-06-25
Payer: MEDICAID

## 2021-06-25 PROCEDURE — 97112 NEUROMUSCULAR REEDUCATION: CPT

## 2021-06-25 PROCEDURE — 97110 THERAPEUTIC EXERCISES: CPT

## 2021-06-25 PROCEDURE — 97530 THERAPEUTIC ACTIVITIES: CPT

## 2021-06-25 PROCEDURE — 97014 ELECTRIC STIMULATION THERAPY: CPT

## 2021-06-25 NOTE — PROGRESS NOTES
PHYSICAL THERAPY - DAILY TREATMENT NOTE    Patient Name: Arash Hollis Sr.        Date: 2021  : 1960    Patient  Verified: YES  Visit #:   20   of   12  Insurance: Payor: Dumont Angers / Plan: [facility] COMMUNITY PLAN [employer] CCCP / Product Type: Managed Care Medicaid /      In time: 2:15 Out time: 3:20   Total Treatment Time: 65     Medicare Time Tracking (below)   Total Timed Codes (min):  - 1:1 Treatment Time:  -     TREATMENT AREA/ DIAGNOSIS = Pain in right knee [M25.561]    SUBJECTIVE  Pain Level (on 0 to 10 scale):  0  / 10   Medication Changes/New allergies or changes in medical history, any new surgeries or procedures? NO    If yes, update Summary List   Subjective Functional Status/Changes:  []  No changes reported     Pt reports no pain in the R knee. Pt states the only pain is in the L knee and it still gives out on him.        OBJECTIVE  Modalities Rationale:     decrease inflammation and decrease pain to improve patient's ability to perform ADLs without pain    10 min [] Estim, type/location: [place] to R VMO                                     []  att     []  unatt     []  w/US     []  w/ice    []  w/heat    min []  Mechanical Traction: type/lbs                   []  pro   []  sup   []  int   []  cont    []  before manual    []  after manual    min []  Ultrasound, settings/location:      min []  Iontophoresis w/ dexamethasone, location:                                               []  take home patch       []  in clinic   10 min [x]  Ice     []  Heat    location/position: B knees    min []  Vasopneumatic Device, press/temp:     min []  Other:    [] Skin assessment post-treatment (if applicable):    []  intact    []  redness- no adverse reaction     []redness  adverse reaction:        20 min Therapeutic Exercise:  [x]  See flow sheet   Rationale:      increase ROM and increase strength to improve the patients ability to perform ADLs without pain       15 min Therapeutic Activity: [x] See flow sheet   Rationale:    increase strength and improve coordination to improve the patients ability to perform ADLs without pain      10 min Neuromuscular Re-ed: [x]  See flow sheet   Rationale:    increase strength and improve coordination to improve the patients ability to perform ADLs without pain      Billed With/As:   [x] TE   [] TA   [] Neuro   [] Self Care Patient Education: [x] Review HEP    [] Progressed/Changed HEP based on:   [] positioning   [] body mechanics   [] transfers   [] heat/ice application    [] other:        Other Objective/Functional Measures:    Fair to Good VMO activation with quad set. Pt still demonstrates increased lateral quad activation   Post Treatment Pain Level (on 0 to 10) scale:   0  / 10     ASSESSMENT  Assessment/Changes in Function:     Pt showing great overall improvement in the R LE since onset of care     []  See Progress Note/Recertification   Patient will continue to benefit from skilled PT services to modify and progress therapeutic interventions, address functional mobility deficits, address ROM deficits, address strength deficits and analyze and address soft tissue restrictions to attain remaining goals.    Progress toward goals / Updated goals:    Good Progress to    [] STG    [x] LTG  1 as shown by improved overall pain levels with R knee     PLAN  [x]  Upgrade activities as tolerated YES Continue plan of care   []  Discharge due to :    []  Other:      Therapist: Karina Almonte DPT     Date: 6/25/2021 Time: 2:23 PM        Future Appointments   Date Time Provider Steffen Brumfield   6/30/2021  3:00 PM Prasanth Hermosillo [facility]   9/14/2021  3:00 PM [facility] NURSE VISIT [facility] BS AMB

## 2021-06-30 ENCOUNTER — HOSPITAL ENCOUNTER (OUTPATIENT)
Dept: PHYSICAL THERAPY | Age: 61
End: 2021-06-30
Payer: MEDICAID

## 2021-07-02 ENCOUNTER — APPOINTMENT (OUTPATIENT)
Dept: PHYSICAL THERAPY | Age: 61
End: 2021-07-02

## 2021-07-14 ENCOUNTER — TRANSCRIBE ORDER (OUTPATIENT)
Dept: SCHEDULING | Age: 61
End: 2021-07-14

## 2021-07-14 DIAGNOSIS — R07.9 CHEST PAIN, UNSPECIFIED: Primary | ICD-10-CM

## 2021-08-11 NOTE — PROGRESS NOTES
[address] [facility] PHYSICAL THERAPY AT [address] [facility], [address] [place] [address], [address] [place] [address], [address] [place] [address]  Phone: [phone]  Fax: [phone] SUMMARY  Patient Name: Michele Holstein Sr. : 1960   Treatment/Medical Diagnosis: Pain in right knee [M25.561]   Referral Source: Aureliano Ba MD     Date of Initial Visit: 2021 Attended Visits: 11 Missed Visits: 10     SUMMARY OF TREATMENT  PT consisted of manual therapy techniques, therapeutic exercises, and modalities to improve strength, improve mobility, decrease pain, and improve overall function. CURRENT STATUS  Pt was seen for a total of 11 visit since onset of care. Pt showed great improvement to the R knee since onset of care. Pt states at last visit the R knee was not bothering him. All of his pain has come from the contralateral knee. Pt cancelled final two visits and never returned to PT. Unable to formally assess goals. Goals below are from progress note last visit. Goal/Measure of Progress Goal Met? 1.  Increase FOTO score to 59/100 indicating improved function   Status at last Eval: 43 Current Status: 47 no   2.  Pt will demo hip strength >4+/5 for improved squat mechanics    Status at last Eval: 4/5 Current Status: 4+/5 yes   3.  Pt will demo ability to performed a 90 degree squat with good form and no pain for retrieving floor level items   Status at last Eval: - Current Status: painful no   4.   Pt will demo >15 sec SLS bilaterally for improved safety and stability with dressing ADL's and shower transfers   Status at last Eval: <15sec Current Status: <15sec no        RECOMMENDATIONS  Discontinue therapy due to lack of attendance or compliance. If you have any questions/comments please contact us directly at [phone]. Thank you for allowing us to assist in the care of your patient.     Therapist Signature: Maral Mandel Date:      Time: 10:32 AM

## 2021-12-07 ENCOUNTER — HOSPITAL ENCOUNTER (OUTPATIENT)
Dept: GENERAL RADIOLOGY | Age: 61
Discharge: HOME | End: 2021-12-07
Payer: MEDICAID

## 2021-12-07 DIAGNOSIS — M25.562 LEFT KNEE PAIN: ICD-10-CM

## 2021-12-07 PROCEDURE — 73562 X-RAY EXAM OF KNEE 3: CPT

## 2021-12-16 ENCOUNTER — OFFICE VISIT (OUTPATIENT)
Dept: ORTHOPEDIC SURGERY | Age: 61
End: 2021-12-16
Payer: MEDICAID

## 2021-12-16 VITALS
TEMPERATURE: 97.4 F | OXYGEN SATURATION: 99 % | WEIGHT: 246.4 LBS | RESPIRATION RATE: 15 BRPM | HEIGHT: 71 IN | BODY MASS INDEX: 34.5 KG/M2 | HEART RATE: 60 BPM

## 2021-12-16 DIAGNOSIS — G89.29 CHRONIC PAIN OF LEFT KNEE: ICD-10-CM

## 2021-12-16 DIAGNOSIS — M25.562 CHRONIC PAIN OF LEFT KNEE: ICD-10-CM

## 2021-12-16 DIAGNOSIS — M17.12 PRIMARY OSTEOARTHRITIS OF LEFT KNEE: Primary | ICD-10-CM

## 2021-12-16 PROCEDURE — 99203 OFFICE O/P NEW LOW 30 MIN: CPT | Performed by: SPECIALIST

## 2021-12-16 RX ORDER — ERGOCALCIFEROL 1.25 MG/1
50000 CAPSULE ORAL
COMMUNITY

## 2021-12-16 RX ORDER — NAPROXEN SODIUM 220 MG
220 TABLET ORAL 2 TIMES DAILY WITH MEALS
COMMUNITY
End: 2022-07-27

## 2021-12-16 NOTE — PROGRESS NOTES
Patient: Gina Rudd Sr. MRN: [id number]       SSN: [id number]  YOB: 1960        AGE: 59 y.o. SEX: male    PCP: Teodora Keller MD  12/16/21    Chief Complaint   Patient presents with    Knee Pain     left     HISTORY:  Gina Rudd Sr. is a 59 y.o. male who is seen for left knee pain. He has been experiencing left knee pain for the past year. He had a total arthrotomy and menisectomy when he was 12 yo. He tweaked his knee when he was playing with his dog 1.5 years ago. PT helped temporarily. He feels pain with standing, walking and stair climbing. He experiences startup pain after sitting. He says he can barely walk from his front to back door without his knee sleeve. He experiences knee buckling episodes. He has numbness involving his left little finger that also splits his ring finger. He pushed a trainee out of the way to save him from a severe neck laceration. The sheet sliced his ulnar nerve in the process of saving his fellow employee. He was treated by Dr. Freida Avila in . Pain Assessment  12/16/2021   Location of Pain Knee   Location Modifiers Left   Severity of Pain 8   Quality of Pain Sharp;Dull;Aching; Throbbing   Quality of Pain Comment -   Duration of Pain Persistent   Frequency of Pain Constant   Date Pain First Started -   Date Pain First Started Comment -   Aggravating Factors Bending;Stretching;Straightening;Exercise;Kneeling;Squatting;Standing;Walking;Stairs; Other (Comment)   Aggravating Factors Comment driving   Limiting Behavior Yes   Relieving Factors NSAID;Rest   Relieving Factors Comment -   Result of Injury No   Work-Related Injury -   Type of Injury -     Occupation, etc:  Mr. Tamie Maria receives social security disability benefits for his L ulnar nerve injury. He lives with 15 yo son in . He has 4 daughters, one son, 2 granddaughters, and 4 grandsons. 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 His son's mother lives in [place].  He is starting to workout 2x a week with his son at a gym. Mr. Yrn Rondon weighs 241 lbs and is 5'11\" tall.        Lab Results   Component Value Date/Time    Hemoglobin A1c 6.1 (H) 07/08/2020 11:37 AM     Weight Metrics 12/16/2021 5/26/2021 3/2/2021 10/2/2020 6/26/2017 6/28/2016 5/10/2016   Weight 246 lb 6.4 oz 241 lb 9.6 oz 246 lb 244 lb 6.4 oz 250 lb 250 lb 250 lb   BMI 34.37 kg/m2 33.7 kg/m2 34.31 kg/m2 34.09 kg/m2 34.87 kg/m2 34.88 kg/m2 34.88 kg/m2       Patient Active Problem List   Diagnosis Code    Arm contusion S40.46A    Sprain and strain of shoulder and upper arm [id number]    Diabetes ([facility] [address] E11.9    GERD (gastroesophageal reflux disease) K21.9    H/O mitral valve prolapse Z86.79     REVIEW OF SYSTEMS:    Constitutional Symptoms: Negative   Eyes: Negative   Ears, Nose, Throat and Mouth: Negative   Cardiovascular: Negative   Respiratory: Negative   Genitourinary: Per HPI   Gastrointestinal: Per HPI   Integumentary (Skin and/or Breast): Negative   Musculoskeletal: Per HPI   Endocrine/Rheumatologic: Negative   Neurological: Per HPI   Hematology/Lymphatic: Negative    Allergic/Immunologic: Negative   Phychiatric: Negative    Social History     Socioeconomic History    Marital status: SINGLE     Spouse name: Not on file    Number of children: Not on file    Years of education: Not on file    Highest education level: Not on file   Occupational History    Not on file   Tobacco Use    Smoking status: Never Smoker    Smokeless tobacco: Never Used   Vaping Use    Vaping Use: Never used   Substance and Sexual Activity    Alcohol use: Yes     Comment: socially    Drug use: No     Comment: smokes hemp    Sexual activity: Not Currently   Other Topics Concern    Not on file   Social History Narrative    Not on file     Social Determinants of Health     Financial Resource Strain:     Difficulty of Paying Living Expenses: Not on file   Food Insecurity:     Worried About Running Out of Food in the Last Year: Not on file    [address] in the Last Year: Not on file   Transportation Needs:     Lack of Transportation (Medical): Not on file    Lack of Transportation (Non-Medical): Not on file   Physical Activity:     Days of Exercise per Week: Not on file    Minutes of Exercise per Session: Not on file   Stress:     Feeling of Stress : Not on file   Social Connections:     Frequency of Communication with Friends and Family: Not on file    Frequency of Social Gatherings with Friends and Family: Not on file    Attends Islam Services: Not on file    Active Member of [address] [place] [address] [place] or Organizations: Not on file    Attends Club or Organization Meetings: Not on file    Marital Status: Not on file   Intimate Partner Violence:     Fear of Current or Ex-Partner: Not on file    Emotionally Abused: Not on file    Physically Abused: Not on file    Sexually Abused: Not on file   Housing Stability:     Unable to Pay for Housing in the Last Year: Not on file    Number of [place] in the Last Year: Not on file    Unstable Housing in the Last Year: Not on file      No Known Allergies   Current Outpatient Medications   Medication Sig    naproxen sodium (Aleve) 220 mg tablet Take 220 mg by mouth two (2) times daily (with meals).  ergocalciferol (Vitamin D2) 1,250 mcg (50,000 unit) capsule Take 50,000 Units by mouth.  meloxicam (MOBIC) 15 mg tablet Take 1 tab daily as needed pain with food. (Patient not taking: Reported on 5/26/2021)    calcium carbonate (TUMS) 200 mg calcium (500 mg) chew Take 1 Tab by mouth as needed. (Patient not taking: Reported on 5/26/2021)     No current facility-administered medications for this visit.       PHYSICAL EXAMINATION:  Visit Vitals  Pulse 60   Temp 97.4 °F (36.3 °C)   Resp 15   Ht 5' 11\" (1.803 m)   Wt 246 lb 6.4 oz (111.8 kg)   SpO2 99%   BMI 34.37 kg/m²      ORTHO EXAMINATION:  Examination Right knee Left knee   Skin Intact Intact   Range of motion 120-0 115-10   Effusion - - Medial joint line tenderness - +   Lateral joint line tenderness - +   Popliteal tenderness - -   Osteophytes palpable + +   Mohsens - -   Patella crepitus + +   Anterior drawer - -   Lateral laxity - -   Medial laxity - -   Varus deformity - -   Valgus deformity - -   Pretibial edema - -   Calf tenderness - -      Left hand/forearm: Thenar atrophy, dorsal inner osseous atrophy, clawing little finger, healed laceration volar ulnar aspect of the forearm      RADIOGRAPHS:  XR LEFT KNEE 12/7/21 [facility]  IMPRESSION  Moderate tricompartmental osteoarthritic changes with mild joint effusion as summarized above. No clearly acute osseous findings.  -I have independently reviewed these images during this office visit. -Dr. Rose Husain:  Three views with bilateral knees on AP view - No fractures, no effusion, moderately severe joint space narrowing, + osteophytes present. Kellgren Roel grade 3, valgus deformity. XR RIGHT KNEE 8/18/20 [facility]  IMPRESSION:  No acute radiographic findings. Notable degenerative changes characterized by large suprapatellar loose body, also present on prior. IMPRESSION:      ICD-10-CM ICD-9-CM    1. Primary osteoarthritis of left knee  M17.12 715.16 REFERRAL TO PHYSICAL THERAPY      REFERRAL TO PHYSICAL THERAPY   2. Chronic pain of left knee  M25.562 719.46 REFERRAL TO PHYSICAL THERAPY    G89.29 338.29 REFERRAL TO PHYSICAL THERAPY     PLAN:  He will start a brief course of outpatient physical therapy. Consider visco supplementation if pain continues. There is no need for surgery or injection at this time. He will follow up as needed.       Scribed by MD Mohsen Murdock) as dictated by Carmelo Butt MD

## 2022-01-06 ENCOUNTER — APPOINTMENT (OUTPATIENT)
Dept: PHYSICAL THERAPY | Age: 62
End: 2022-01-06

## 2022-01-12 ENCOUNTER — APPOINTMENT (OUTPATIENT)
Dept: PHYSICAL THERAPY | Age: 62
End: 2022-01-12

## 2022-04-13 ENCOUNTER — OFFICE VISIT (OUTPATIENT)
Dept: ORTHOPEDIC SURGERY | Age: 62
End: 2022-04-13
Payer: MEDICAID

## 2022-04-13 VITALS — WEIGHT: 246 LBS | OXYGEN SATURATION: 96 % | TEMPERATURE: 96.9 F | BODY MASS INDEX: 34.31 KG/M2 | HEART RATE: 75 BPM

## 2022-04-13 DIAGNOSIS — S49.91XA RIGHT SHOULDER INJURY, INITIAL ENCOUNTER: Primary | ICD-10-CM

## 2022-04-13 DIAGNOSIS — M12.811 ROTATOR CUFF ARTHROPATHY OF RIGHT SHOULDER: ICD-10-CM

## 2022-04-13 PROCEDURE — 99213 OFFICE O/P EST LOW 20 MIN: CPT | Performed by: ORTHOPAEDIC SURGERY

## 2022-04-13 PROCEDURE — 73030 X-RAY EXAM OF SHOULDER: CPT | Performed by: ORTHOPAEDIC SURGERY

## 2022-04-13 PROCEDURE — 20611 DRAIN/INJ JOINT/BURSA W/US: CPT | Performed by: ORTHOPAEDIC SURGERY

## 2022-04-13 RX ORDER — MELOXICAM 15 MG/1
15 TABLET ORAL DAILY
Qty: 30 TABLET | Refills: 1 | Status: SHIPPED | OUTPATIENT
Start: 2022-04-13 | End: 2022-07-27

## 2022-04-13 RX ORDER — TRIAMCINOLONE ACETONIDE 40 MG/ML
40 INJECTION, SUSPENSION INTRA-ARTICULAR; INTRAMUSCULAR ONCE
Status: COMPLETED | OUTPATIENT
Start: 2022-04-13 | End: 2022-04-13

## 2022-04-13 RX ADMIN — TRIAMCINOLONE ACETONIDE 40 MG: 40 INJECTION, SUSPENSION INTRA-ARTICULAR; INTRAMUSCULAR at 14:40

## 2022-04-13 NOTE — PROGRESS NOTES
Kenzie Jacobs .  1960   Chief Complaint   Patient presents with    Shoulder Pain     lt        HISTORY OF PRESENT ILLNESS  Kori Kimbrough is a 59 y.o. male who presents today for reevaluation of right shoulder pain. Patient rates pain as 6/10 today. Injured the shoulder in 2005 while benching. He notes his  lost his  and he felt a tearing sensation. Notes cracking, burning and stabbing posteriorly. He has pain at night time that wakes him up. Pain with reaching overhead. He has been taking Aleeve. Patient denies any fever, chills, chest pain, shortness of breath or calf pain. The remainder of the review of systems is negative. There are no new illness or injuries to report since last seen in the office. There are no changes to medications, allergies, family or social history. PHYSICAL EXAM:   Visit Vitals  Pulse 75   Temp 96.9 °F (36.1 °C) (Temporal)   Wt 246 lb (111.6 kg)   SpO2 96%   BMI 34.31 kg/m²     The patient is a well-developed, well-nourished male   in no acute distress. The patient is alert and oriented times three. The patient is alert and oriented times three. Mood and affect are normal.  LYMPHATIC: lymph nodes are not enlarged and are within normal limits  SKIN: normal in color and non tender to palpation. There are no bruises or abrasions noted. NEUROLOGICAL: Motor sensory exam is within normal limits. Reflexes are equal bilaterally.  There is normal sensation to pinprick and light touch  MUSCULOSKELETAL:  Examination Right shoulder   Skin Intact   AC joint tenderness -   Biceps tenderness -   Forward flexion/Elevation    Active abduction    Glenohumeral abduction 90   External rotation ROM 90   Internal rotation ROM 70   Apprehension -   Isamars Relocation -   Jerk -   Load and Shift -   Obriens -   Speeds -   Impingement sign -   Supraspinatus/Empty Can -, 5/5   External Rotation Strength -, 5/5   Lift Off/Belly Press -, 5/5   Neurovascular Intact PROCEDURE:   Right shoulder Injection with Ultrasound Guidance    Indication:Right Shoulder pain/swelling    After sterile prep, 6 cc of Xylocaine and 1 cc of Kenalog were injected into the right Shoulder. Intra-bursal Ultrasound images captured using [address] Ultrasound machine using a frequency of 10 MHz with a linear transducer and scanned into patient's chart. [facility]  OFFICE PROCEDURE PROGRESS NOTE        Chart reviewed for the following:  Florence Azar M.D, have reviewed the History, Physical and updated the Allergic reactions for [place] performed immediately prior to start of procedure:  I, Natalia Pelaez M.D, have performed the following reviews on [employer] . prior to the start of the procedure:            * Patient was identified by name and date of birth   * Agreement on procedure being performed was verified  * Risks and Benefits explained to the patient  * Procedure site verified and marked as necessary  * Patient was positioned for comfort  * Needle placement confirmed by ultrasound  * Consent was signed and verified     Time: 2:23 PM     Date of procedure: 4/14/2022    Procedure performed by:  Natalia Pelaez M.D    Provider assisted by: (see medication administration)    How tolerated by patient: tolerated the procedure well with no complications    Comments: none      IMAGING: XR of the right shoulder with 3 views obtained in the office dated 4/13/2022 was reviewed and read by Dr. Kathi Barnes: slight Proximal migration of the humeral head with degenerative changes in the [facility] joint     IMPRESSION:      ICD-10-CM ICD-9-CM    1. Right shoulder injury, initial encounter  S49. 91XA 959.2 AMB POC XRAY, SHOULDER; COMPLETE, 2+   2.  Rotator cuff arthropathy of right shoulder  M12.811 716.81 ARTHROCENTESIS ASPIR&/INJ MAJOR JT/BURSA W/US      triamcinolone acetonide (KENALOG-40) 40 mg/mL injection 40 mg      meloxicam (MOBIC) 15 mg tablet        PLAN:   1. Pt presents today with right shoulder pain due to rotator cuff arthropathy and I am hopeful a right shoulder cortisone injection will provide relief. Discussed ordering a MRI to assess the RC if the pain does not improve with the injection. Risk factors include: dm  2. No ultrasound exam indicated today  3. Yes cortisone injection indicated today   4. No Physical/Occupational Therapy indicated today  5. No diagnostic test indicated today:   6. No durable medical equipment indicated today  7. No referral indicated today   8. No medications indicated today:   9. No Narcotic indicated today       RTC 4 weeks       Scribed by Trenton Chu [address]) as dictated by Ag Ramos MD    I, Dr. Ag Ramos, confirm that all documentation is accurate.     Ag Ramos M.D.   [facility]

## 2022-05-11 ENCOUNTER — OFFICE VISIT (OUTPATIENT)
Dept: ORTHOPEDIC SURGERY | Age: 62
End: 2022-05-11
Payer: MEDICAID

## 2022-05-11 VITALS — WEIGHT: 246 LBS | BODY MASS INDEX: 34.44 KG/M2 | TEMPERATURE: 97.1 F | HEIGHT: 71 IN

## 2022-05-11 DIAGNOSIS — M12.811 ROTATOR CUFF ARTHROPATHY OF RIGHT SHOULDER: Primary | ICD-10-CM

## 2022-05-11 PROCEDURE — 99213 OFFICE O/P EST LOW 20 MIN: CPT | Performed by: PHYSICIAN ASSISTANT

## 2022-05-11 RX ORDER — ZOLPIDEM TARTRATE 10 MG/1
10 TABLET ORAL
Qty: 30 TABLET | Refills: 0 | Status: SHIPPED | OUTPATIENT
Start: 2022-05-11

## 2022-05-11 NOTE — PROGRESS NOTES
Александр Noble .  1960   Chief Complaint   Patient presents with    Shoulder Pain     right rotator cuff         HISTORY OF PRESENT ILLNESS  Fabiola Roy is a 59 y.o. male who presents today for reevaluation of right shoulder pain. Patient rates pain as 3/10 today. At last OV on 4/13/2022, patient had a right shoulder cortisone injection which provided about 10 days of relief. Injured the shoulder in 2005 while benching. He notes his  lost his  and he felt a tearing sensation. Notes cracking, burning and stabbing posteriorly. He has pain at night time that wakes him up. Describes it as a \"hot knife\" in the shoulder. He has completed formal physical therapy for the shoulder about one year ago with minimal relief. Pain with reaching overhead and lifting objects. He has been taking Aleve. Patient denies any fever, chills, chest pain, shortness of breath or calf pain. The remainder of the review of systems is negative. There are no new illness or injuries to report since last seen in the office. There are no changes to medications, allergies, family or social history. PHYSICAL EXAM:   Visit Vitals  Temp 97.1 °F (36.2 °C)   Ht 5' 11\" (1.803 m)   Wt 246 lb (111.6 kg)   BMI 34.31 kg/m²     The patient is a well-developed, well-nourished male   in no acute distress. The patient is alert and oriented times three. The patient is alert and oriented times three. Mood and affect are normal.  LYMPHATIC: lymph nodes are not enlarged and are within normal limits  SKIN: normal in color and non tender to palpation. There are no bruises or abrasions noted. NEUROLOGICAL: Motor sensory exam is within normal limits. Reflexes are equal bilaterally.  There is normal sensation to pinprick and light touch  MUSCULOSKELETAL:  Examination Right shoulder   Skin Intact   AC joint tenderness -   Biceps tenderness -   Forward flexion/Elevation    Active abduction    Glenohumeral abduction 90 External rotation ROM 70   Internal rotation ROM 30   Apprehension -   Isamars Relocation -   Jerk -   Load and Shift -   Obriens -   Speeds -   Impingement sign +   Supraspinatus/Empty Can ++, 4/5   External Rotation Strength -, 5/5   Lift Off/Belly Press -, 5/5   Neurovascular Intact          PROCEDURE: none    IMAGING: XR of the right shoulder with 3 views obtained in the office dated 4/13/2022 was reviewed and read by Dr. Loren Baker: slight Proximal migration of the humeral head with degenerative changes in the [facility] joint     IMPRESSION:      ICD-10-CM ICD-9-CM    1. Rotator cuff arthropathy of right shoulder  M12.811 716.81         PLAN:   1. Pt presents today with right shoulder pain due to rotator cuff arthropathy. Pt has failed conservative treatments including cortisone injections, OTC NSAIDs, and physical therapy. We will order a MRI to assess the rotator cuff. Prescribing Ambien to help with night pain. Advised to use pain as a guide and continue to modify activities. Risk factors include: dm  2. No ultrasound exam indicated today  3. No cortisone injection indicated today   4. No Physical/Occupational Therapy indicated today  5. Yes diagnostic test indicated today: R SHOULDER MRI   6. No durable medical equipment indicated today  7. No referral indicated today   8. Yes medications indicated today: AMBIEN   9.  No Narcotic indicated today       RTC following MRI        Scribed by Pradeep Jacobo [address]) as dictated by RAJESH Parkinson PA-C [facility]

## 2022-05-25 ENCOUNTER — TELEPHONE (OUTPATIENT)
Dept: ORTHOPEDIC SURGERY | Age: 62
End: 2022-05-25

## 2022-05-25 DIAGNOSIS — M12.811 ROTATOR CUFF ARTHROPATHY OF RIGHT SHOULDER: Primary | ICD-10-CM

## 2022-05-25 NOTE — TELEPHONE ENCOUNTER
Madison March from [employer] called stating the patient's right shoulder MRI has been denied and a peer to peer is available. Patient is scheduled for 5/28.     Call [phone], case # [id number]

## 2022-06-13 NOTE — TELEPHONE ENCOUNTER
[facility] called again and would like to know if a Peer to Peer has ever been done for the patient Right Shoulder. Peer to Peer   Tel. [phone]    Case # [id number].

## 2022-06-14 NOTE — TELEPHONE ENCOUNTER
Peer to peer is closed. Please cancel other order.  Place new order in for MRI of shoulder r/o rotator cuff tear for pre op planning

## 2022-07-20 ENCOUNTER — CLINICAL SUPPORT (OUTPATIENT)
Dept: SURGERY | Age: 62
End: 2022-07-20

## 2022-07-20 VITALS
BODY MASS INDEX: 34.44 KG/M2 | HEIGHT: 71 IN | OXYGEN SATURATION: 97 % | RESPIRATION RATE: 17 BRPM | HEART RATE: 68 BPM | TEMPERATURE: 96.9 F | WEIGHT: 246 LBS

## 2022-07-20 DIAGNOSIS — Z12.11 COLON CANCER SCREENING: Primary | ICD-10-CM

## 2022-07-20 DIAGNOSIS — R19.5 POSITIVE COLORECTAL CANCER SCREENING USING COLOGUARD TEST: ICD-10-CM

## 2022-07-20 NOTE — PROGRESS NOTES
Colon Screen    Patient: Kely Dave Sr. MRN: [id number]  SSN: [id number]    YOB: 1960  Age: 59 y.o. Sex: male        Subjective:   Kely Dave Sr. was referred by his PCP, Daija Montez MD.  Patient referred for colonoscopy for   Screening colonoscopy. Patient denies rectal pain or bleeding. Abdominal surgeries as described below, specifically none  Family history as described below, specifically none. Patient has never had a colonoscopy. No Known Allergies    Past Medical History:   Diagnosis Date    Diabetes ([address]     Borderline    GERD (gastroesophageal reflux disease)     H/O mitral valve prolapse     ASYMPTOMATIC, diagnosed back in the 1980's, now has resolved     Past Surgical History:   Procedure Laterality Date    HX ORTHOPAEDIC      left knee surgery    HX ORTHOPAEDIC Left 2009    Arm    AL ANESTH,SURGERY OF SHOULDER Right     rotator cuff      Family History   Problem Relation Age of Onset    Cancer Father     Hearing Impairment Maternal Uncle 76    Hypertension Maternal Uncle      Social History     Tobacco Use    Smoking status: Never    Smokeless tobacco: Never   Substance Use Topics    Alcohol use: Yes     Comment: socially      Prior to Admission medications    Medication Sig Start Date End Date Taking? Authorizing Provider   ergocalciferol (ERGOCALCIFEROL) 1,250 mcg (50,000 unit) capsule Take 50,000 Units by mouth. Yes Provider, Historical   zolpidem (AMBIEN) 10 mg tablet Take 1 Tablet by mouth nightly as needed for Sleep. Max Daily Amount: 10 mg. Patient not taking: Reported on 7/20/2022 5/11/22   ELIUD Vera   meloxicam (MOBIC) 15 mg tablet Take 1 Tablet by mouth daily. Patient not taking: No sig reported 4/13/22   Frederick Gonzalez MD   naproxen sodium (NAPROSYN) 220 mg tablet Take 220 mg by mouth two (2) times daily (with meals).   Patient not taking: No sig reported    Provider, Historical   meloxicam (MOBIC) 15 mg tablet Take 1 tab daily as needed pain with food. Patient not taking: No sig reported 10/2/20   Eli Baron, DO   calcium carbonate (TUMS) 200 mg calcium (500 mg) chew Take 1 Tab by mouth as needed. Patient not taking: No sig reported    Provider, Historical          Review of Systems:  Review of Systems   Constitutional:  Positive for weight loss. Negative for chills, diaphoresis, fever and malaise/fatigue. HENT: Negative. Eyes: Negative. Respiratory:  Positive for cough. Negative for hemoptysis, sputum production, shortness of breath and wheezing. Dry cough x 5-10 days, negative COVID test   Cardiovascular:  Positive for chest pain and palpitations. Negative for orthopnea, claudication, leg swelling and PND. Gastrointestinal: Negative. Genitourinary: Negative. Musculoskeletal:  Positive for joint pain. Negative for back pain, falls, myalgias and neck pain. Skin: Negative. Neurological:  Positive for dizziness. Negative for tingling, tremors, sensory change, speech change, focal weakness, seizures, loss of consciousness, weakness and headaches. Today due to heat, no a/c in car   Endo/Heme/Allergies: Negative. Psychiatric/Behavioral: Negative. Risks colonoscopy described- colon injury, missed lesion, anesthesia problems, bleeding       Ana Huff, GRZEGORZ  July 20, 0052  1:64 PM

## 2022-07-26 ENCOUNTER — ANESTHESIA EVENT (OUTPATIENT)
Dept: ENDOSCOPY | Age: 62
End: 2022-07-26
Payer: MEDICAID

## 2022-07-27 ENCOUNTER — HOSPITAL ENCOUNTER (OUTPATIENT)
Age: 62
Setting detail: OUTPATIENT SURGERY
Discharge: HOME | End: 2022-07-27
Attending: COLON & RECTAL SURGERY | Admitting: COLON & RECTAL SURGERY
Payer: MEDICAID

## 2022-07-27 ENCOUNTER — ANESTHESIA (OUTPATIENT)
Dept: ENDOSCOPY | Age: 62
End: 2022-07-27
Payer: MEDICAID

## 2022-07-27 VITALS
HEART RATE: 52 BPM | HEIGHT: 71 IN | RESPIRATION RATE: 16 BRPM | WEIGHT: 229 LBS | SYSTOLIC BLOOD PRESSURE: 130 MMHG | BODY MASS INDEX: 32.06 KG/M2 | TEMPERATURE: 97.6 F | DIASTOLIC BLOOD PRESSURE: 81 MMHG | OXYGEN SATURATION: 97 %

## 2022-07-27 LAB
AMPHET UR QL SCN: NEGATIVE
BARBITURATES UR QL SCN: NEGATIVE
BENZODIAZ UR QL: NEGATIVE
CANNABINOIDS UR QL SCN: POSITIVE
COCAINE UR QL SCN: NEGATIVE
GLUCOSE BLD STRIP.AUTO-MCNC: 108 MG/DL (ref 70–110)
HDSCOM,HDSCOM: ABNORMAL
METHADONE UR QL: NEGATIVE
OPIATES UR QL: NEGATIVE
PCP UR QL: NEGATIVE

## 2022-07-27 PROCEDURE — C1729 CATH, DRAINAGE: HCPCS | Performed by: COLON & RECTAL SURGERY

## 2022-07-27 PROCEDURE — 00812 ANES LWR INTST SCR COLSC: CPT | Performed by: NURSE ANESTHETIST, CERTIFIED REGISTERED

## 2022-07-27 PROCEDURE — 80307 DRUG TEST PRSMV CHEM ANLYZR: CPT

## 2022-07-27 PROCEDURE — 77030013992 HC SNR POLYP ENDOSC BSC -B: Performed by: COLON & RECTAL SURGERY

## 2022-07-27 PROCEDURE — 74011000250 HC RX REV CODE- 250: Performed by: NURSE ANESTHETIST, CERTIFIED REGISTERED

## 2022-07-27 PROCEDURE — 77030008565 HC TBNG SUC IRR ERBE -B: Performed by: COLON & RECTAL SURGERY

## 2022-07-27 PROCEDURE — 2709999900 HC NON-CHARGEABLE SUPPLY: Performed by: COLON & RECTAL SURGERY

## 2022-07-27 PROCEDURE — 74011250636 HC RX REV CODE- 250/636: Performed by: NURSE ANESTHETIST, CERTIFIED REGISTERED

## 2022-07-27 PROCEDURE — 00812 ANES LWR INTST SCR COLSC: CPT | Performed by: ANESTHESIOLOGY

## 2022-07-27 PROCEDURE — 77030021593 HC FCPS BIOP ENDOSC BSC -A: Performed by: COLON & RECTAL SURGERY

## 2022-07-27 PROCEDURE — 76060000032 HC ANESTHESIA 0.5 TO 1 HR: Performed by: COLON & RECTAL SURGERY

## 2022-07-27 PROCEDURE — 74011250637 HC RX REV CODE- 250/637: Performed by: COLON & RECTAL SURGERY

## 2022-07-27 PROCEDURE — 45380 COLONOSCOPY AND BIOPSY: CPT | Performed by: COLON & RECTAL SURGERY

## 2022-07-27 PROCEDURE — 76040000007: Performed by: COLON & RECTAL SURGERY

## 2022-07-27 PROCEDURE — 88305 TISSUE EXAM BY PATHOLOGIST: CPT

## 2022-07-27 PROCEDURE — 82962 GLUCOSE BLOOD TEST: CPT

## 2022-07-27 PROCEDURE — 45385 COLONOSCOPY W/LESION REMOVAL: CPT | Performed by: COLON & RECTAL SURGERY

## 2022-07-27 RX ORDER — SODIUM CHLORIDE 0.9 % (FLUSH) 0.9 %
5-40 SYRINGE (ML) INJECTION EVERY 8 HOURS
Status: DISCONTINUED | OUTPATIENT
Start: 2022-07-27 | End: 2022-07-27 | Stop reason: HOSPADM

## 2022-07-27 RX ORDER — FAMOTIDINE 20 MG/1
20 TABLET, FILM COATED ORAL ONCE
Status: DISCONTINUED | OUTPATIENT
Start: 2022-07-27 | End: 2022-07-27

## 2022-07-27 RX ORDER — SODIUM CHLORIDE 0.9 % (FLUSH) 0.9 %
5-40 SYRINGE (ML) INJECTION AS NEEDED
Status: DISCONTINUED | OUTPATIENT
Start: 2022-07-27 | End: 2022-07-27 | Stop reason: HOSPADM

## 2022-07-27 RX ORDER — LIDOCAINE HYDROCHLORIDE 10 MG/ML
0.1 INJECTION, SOLUTION EPIDURAL; INFILTRATION; INTRACAUDAL; PERINEURAL AS NEEDED
Status: DISCONTINUED | OUTPATIENT
Start: 2022-07-27 | End: 2022-07-27 | Stop reason: HOSPADM

## 2022-07-27 RX ORDER — SODIUM CHLORIDE, SODIUM LACTATE, POTASSIUM CHLORIDE, CALCIUM CHLORIDE 600; 310; 30; 20 MG/100ML; MG/100ML; MG/100ML; MG/100ML
50 INJECTION, SOLUTION INTRAVENOUS CONTINUOUS
Status: DISCONTINUED | OUTPATIENT
Start: 2022-07-27 | End: 2022-07-27 | Stop reason: HOSPADM

## 2022-07-27 RX ORDER — LIDOCAINE HYDROCHLORIDE 20 MG/ML
INJECTION, SOLUTION EPIDURAL; INFILTRATION; INTRACAUDAL; PERINEURAL AS NEEDED
Status: DISCONTINUED | OUTPATIENT
Start: 2022-07-27 | End: 2022-07-27 | Stop reason: HOSPADM

## 2022-07-27 RX ORDER — SODIUM CHLORIDE, SODIUM LACTATE, POTASSIUM CHLORIDE, CALCIUM CHLORIDE 600; 310; 30; 20 MG/100ML; MG/100ML; MG/100ML; MG/100ML
75 INJECTION, SOLUTION INTRAVENOUS CONTINUOUS
Status: DISCONTINUED | OUTPATIENT
Start: 2022-07-27 | End: 2022-07-27 | Stop reason: HOSPADM

## 2022-07-27 RX ORDER — INSULIN LISPRO 100 [IU]/ML
INJECTION, SOLUTION INTRAVENOUS; SUBCUTANEOUS ONCE
Status: DISCONTINUED | OUTPATIENT
Start: 2022-07-27 | End: 2022-07-27

## 2022-07-27 RX ORDER — INSULIN LISPRO 100 [IU]/ML
INJECTION, SOLUTION INTRAVENOUS; SUBCUTANEOUS ONCE
Status: DISCONTINUED | OUTPATIENT
Start: 2022-07-27 | End: 2022-07-27 | Stop reason: HOSPADM

## 2022-07-27 RX ORDER — PROPOFOL 10 MG/ML
INJECTION, EMULSION INTRAVENOUS AS NEEDED
Status: DISCONTINUED | OUTPATIENT
Start: 2022-07-27 | End: 2022-07-27 | Stop reason: HOSPADM

## 2022-07-27 RX ORDER — DEXTROMETHORPHAN/PSEUDOEPHED 2.5-7.5/.8
DROPS ORAL AS NEEDED
Status: DISCONTINUED | OUTPATIENT
Start: 2022-07-27 | End: 2022-07-27 | Stop reason: HOSPADM

## 2022-07-27 RX ORDER — ONDANSETRON 2 MG/ML
4 INJECTION INTRAMUSCULAR; INTRAVENOUS AS NEEDED
Status: DISCONTINUED | OUTPATIENT
Start: 2022-07-27 | End: 2022-07-27 | Stop reason: HOSPADM

## 2022-07-27 RX ORDER — SODIUM CHLORIDE, SODIUM LACTATE, POTASSIUM CHLORIDE, CALCIUM CHLORIDE 600; 310; 30; 20 MG/100ML; MG/100ML; MG/100ML; MG/100ML
INJECTION, SOLUTION INTRAVENOUS
Status: DISCONTINUED | OUTPATIENT
Start: 2022-07-27 | End: 2022-07-27 | Stop reason: HOSPADM

## 2022-07-27 RX ORDER — MAGNESIUM SULFATE 100 %
4 CRYSTALS MISCELLANEOUS AS NEEDED
Status: DISCONTINUED | OUTPATIENT
Start: 2022-07-27 | End: 2022-07-27 | Stop reason: HOSPADM

## 2022-07-27 RX ADMIN — PROPOFOL 20 MG: 10 INJECTION, EMULSION INTRAVENOUS at 07:47

## 2022-07-27 RX ADMIN — PROPOFOL 50 MG: 10 INJECTION, EMULSION INTRAVENOUS at 07:34

## 2022-07-27 RX ADMIN — FAMOTIDINE 20 MG: 10 INJECTION, SOLUTION INTRAVENOUS at 07:01

## 2022-07-27 RX ADMIN — PROPOFOL 50 MG: 10 INJECTION, EMULSION INTRAVENOUS at 07:33

## 2022-07-27 RX ADMIN — PROPOFOL 30 MG: 10 INJECTION, EMULSION INTRAVENOUS at 07:37

## 2022-07-27 RX ADMIN — PROPOFOL 30 MG: 10 INJECTION, EMULSION INTRAVENOUS at 07:39

## 2022-07-27 RX ADMIN — PROPOFOL 20 MG: 10 INJECTION, EMULSION INTRAVENOUS at 07:41

## 2022-07-27 RX ADMIN — PROPOFOL 20 MG: 10 INJECTION, EMULSION INTRAVENOUS at 07:43

## 2022-07-27 RX ADMIN — LIDOCAINE HYDROCHLORIDE 40 MG: 20 INJECTION, SOLUTION EPIDURAL; INFILTRATION; INTRACAUDAL; PERINEURAL at 07:33

## 2022-07-27 RX ADMIN — PROPOFOL 30 MG: 10 INJECTION, EMULSION INTRAVENOUS at 07:35

## 2022-07-27 RX ADMIN — PROPOFOL 20 MG: 10 INJECTION, EMULSION INTRAVENOUS at 07:53

## 2022-07-27 RX ADMIN — SODIUM CHLORIDE, SODIUM LACTATE, POTASSIUM CHLORIDE, AND CALCIUM CHLORIDE: 600; 310; 30; 20 INJECTION, SOLUTION INTRAVENOUS at 07:27

## 2022-07-27 RX ADMIN — PROPOFOL 20 MG: 10 INJECTION, EMULSION INTRAVENOUS at 07:50

## 2022-07-27 NOTE — OP NOTES
[facility]  [address], [address]   [place], [address]  [phone]                    Colonoscopy Procedure Note      Mireya Bear  [birth year]  [id number]                Date of Procedure: 7/27/2022    Preoperative diagnosis: Colon cancer Screening:  Z12.11    Postoperative diagnosis: cecal polyps x2, transverse polyps x3, descending polyp x1, mid-descending polyp x1    :  Vivien Duncan MD    Assistant(s): Endoscopy Technician-1: Sarita Lincoln  [place] Staff: Domenic Guzman RN    Sedation: MAC    Complications: None    Implants: None    Procedure Details:  Prior to the procedure, a history and physical were performed. The patients medications, allergies and sensitivities were reviewed and all questions were answered. After informed consent was obtained for the procedure, with all risks and benefits of procedure explained. The patient was taken to the endoscopy suite and placed in the left lateral decubitus position. Patient identification and proposed procedure were verified prior to the procedure by the nurse and I. After sequential anesthesia administered by anesthesiologist, a digital rectal exam was performed and was normal.  The Olympus video colonoscope was introduced through the anus and advanced to cecum, which was identified by the ileocecal valve and appendiceal orifice. The quality of preparation was good. The colonoscope was slowly withdrawn and the mucosa examined for any abnormalities. Cecal withdrawal time was greater than 6 minutes. The patient tolerated the procedure well. There were no complications.       Findings/Interventions:   Polyps - #1, 5 mm in size, located in the cecum, removed by cold biopsy and sent for pathology, - #2, 10 mm in size, located in the cecum, removed by snare cautery and retrieved for pathology, - #3, 10 mm in size, located in the transverse colon, removed by snare cautery and retrieved for pathology, - #4, 7 mm in size, located in the transverse colon, removed by snare cautery and retrieved for pathology, - #5, 5 mm in size, located in the transverse colon, removed by cold biopsy and sent for pathology, - #6, 7 mm in size, located in the descending colon, removed by snare cautery and retrieved for pathology, - #7, 15 mm in size, located in the descending colon (distal), removed by snare cautery and retrieved for pathology    EBL: none    Recommendations: -Repeat colonoscopy in 3 years.    NO aspirin for 5 days     Discharge Disposition:  Home  Aurelia Carrasco MD  7/27/2022  8:02 AM

## 2022-07-27 NOTE — DISCHARGE INSTRUCTIONS
Colonoscopy: What to Expect at [address] [place] [address]  After a colonoscopy, you'll stay at the clinic until you wake up. Then you can go home. But you'll need to arrange for a ride. Your doctor will tell you when you can eat and do your other usual activities. Your doctor will talk to you about when you'll need your next colonoscopy. Your doctor can help you decide how often you need to be checked. This will depend on the results of your test and your risk for colorectal cancer. After the test, you may be bloated or have gas pains. You may need to pass gas. If a biopsy was done or a polyp was removed, you may have streaks of blood in your stool (feces) for a few days. Problems such as heavy rectal bleeding may not occur until several weeks after the test. This isn't common. But it can happen after polyps are removed. This care sheet gives you a general idea about how long it will take for you to recover. But each person recovers at a different pace. Follow the steps below to get better as quickly as possible. How can you care for yourself at home? Activity    Rest when you feel tired. You can do your normal activities when it feels okay to do so. Diet    Follow your doctor's directions for eating. Unless your doctor has told you not to, drink plenty of fluids. This helps to replace the fluids that were lost during the colon prep. Do not drink alcohol. Medicines    Your doctor will tell you if and when you can restart your medicines. You will also be given instructions about taking any new medicines. If you take aspirin or some other blood thinner, ask your doctor if and when to start taking it again. Make sure that you understand exactly what your doctor wants you to do. If polyps were removed or a biopsy was done during the test, your doctor may tell you not to take aspirin or other anti-inflammatory medicines for a few days.  These include ibuprofen (Advil, Motrin) and naproxen (Susana). Other instructions    For your safety, do not drive or operate machinery until the medicine wears off and you can think clearly. Your doctor may tell you not to drive or operate machinery until the day after your test.     Do not sign legal documents or make major decisions until the medicine wears off and you can think clearly. The anesthesia can make it hard for you to fully understand what you are agreeing to. Follow-up care is a key part of your treatment and safety. Be sure to make and go to all appointments, and call your doctor if you are having problems. It's also a good idea to know your test results and keep a list of the medicines you take. When should you call for help? Call 911 anytime you think you may need emergency care. For example, call if:    You passed out (lost consciousness). You pass maroon or bloody stools. You have trouble breathing. Call your doctor now or seek immediate medical care if:    You have pain that does not get better after you take pain medicine. You are sick to your stomach or cannot drink fluids. You have new or worse belly pain. You have blood in your stools. You have a fever. You cannot pass stools or gas. Watch closely for changes in your health, and be sure to contact your doctor if you have any problems. Where can you learn more? Go to http://www.gray.com/  Enter E264 in the search box to learn more about \"Colonoscopy: What to Expect at Home. \"  Current as of: September 8, 2021               Content Version: 13.2  © 2006-2022 [employer]. Care instructions adapted under license by [employer] (which disclaims liability or warranty for this information). If you have questions about a medical condition or this instruction, always ask your healthcare professional. [place] [address] any warranty or liability for your use of this information.          Colon Polyps: Care Instructions  Your Care Instructions     Colon polyps are growths in the colon or the rectum. The cause of most colon polyps is not known, and most people who get them do not have any problems. But a certain kind can turn into cancer. For this reason, regular testing for colon polyps is important for people as they get older. It is also important for anyone who has an increased risk for colon cancer. Polyps are usually found through routine colon cancer screening tests. Although most colon polyps are not cancerous, they are usually removed and then tested for cancer. Screening for colon cancer saves lives because the cancer can usually be cured if it is caught early. If you have a polyp that is the type that can turn into cancer, you may need more tests to examine your entire colon. The doctor will remove any other polyps that he or she finds, and you will be tested more often. Follow-up care is a key part of your treatment and safety. Be sure to make and go to all appointments, and call your doctor if you are having problems. It's also a good idea to know your test results and keep a list of the medicines you take. How can you care for yourself at home? Regular exams to look for colon polyps are the best way to prevent polyps from turning into colon cancer. These can include stool tests, sigmoidoscopy, colonoscopy, and CT colonography. Talk with your doctor about a testing schedule that is right for you. To prevent polyps  There is no home treatment that can prevent colon polyps. But these steps may help lower your risk for cancer. Stay active. Being active can help you get to and stay at a healthy weight. Try to exercise on most days of the week. Walking is a good choice. Eat well. Choose a variety of vegetables, fruits, legumes (such as peas and beans), fish, poultry, and whole grains. Do not smoke. If you need help quitting, talk to your doctor about stop-smoking programs and medicines. These can increase your chances of quitting for good. If you drink alcohol, limit how much you drink. Limit alcohol to 2 drinks a day for men and 1 drink a day for women. When should you call for help? Call your doctor now or seek immediate medical care if:    You have severe belly pain. Your stools are maroon or very bloody. Watch closely for changes in your health, and be sure to contact your doctor if:    You have a fever. You have nausea or vomiting. You have a change in bowel habits (new constipation or diarrhea). Your symptoms get worse or are not improving as expected. Where can you learn more? Go to http://www.wilson.com/  Enter C571 in the search box to learn more about \"Colon Polyps: Care Instructions. \"  Current as of: September 8, 2021               Content Version: 13.2  © 2006-2022 [employer]. Care instructions adapted under license by [employer] (which disclaims liability or warranty for this information). If you have questions about a medical condition or this instruction, always ask your healthcare professional. [place] [address] any warranty or liability for your use of this information. DISCHARGE SUMMARY from Nurse    PATIENT INSTRUCTIONS:    After general anesthesia or intravenous sedation, for 24 hours or while taking prescription Narcotics:  Limit your activities  Do not drive and operate hazardous machinery  Do not make important personal or business decisions  Do  not drink alcoholic beverages  If you have not urinated within 8 hours after discharge, please contact your surgeon on call.     Report the following to your surgeon:  Excessive pain, swelling, redness or odor of or around the surgical area  Temperature over 100.5  Nausea and vomiting lasting longer than 4 hours or if unable to take medications  Any signs of decreased circulation or nerve impairment to extremity: change in color, persistent numbness, tingling, coldness or increase pain  Any questions        These are general instructions for a healthy lifestyle:    No smoking/ No tobacco products/ Avoid exposure to second hand smoke  Surgeon General's Warning:  Quitting smoking now greatly reduces serious risk to your health. Obesity, smoking, and sedentary lifestyle greatly increases your risk for illness    A healthy diet, regular physical exercise & weight monitoring are important for maintaining a healthy lifestyle    You may be retaining fluid if you have a history of heart failure or if you experience any of the following symptoms:  Weight gain of 3 pounds or more overnight or 5 pounds in a week, increased swelling in our hands or feet or shortness of breath while lying flat in bed. Please call your doctor as soon as you notice any of these symptoms; do not wait until your next office visit. The discharge information has been reviewed with the patient. The patient verbalized understanding. Discharge medications reviewed with the patient and appropriate educational materials and side effects teaching were provided.   ___________________________________________________________________________________________________________________________________

## 2022-07-27 NOTE — PROGRESS NOTES
conducted a pre-surgery visit with Indira PerezGurjit, who is a 59 y.o.,male. The  provided the following Interventions:  Initiated a relationship of care and support. Offered prayer and assurance of continued prayers on patient's behalf. There is no advance directive present. Plan:  Chaplains will continue to follow and will provide pastoral care on an as needed/requested basis.  recommends bedside caregivers page  on duty if patient shows signs of acute spiritual or emotional distress.    Reiseñor [address]   Board Certified [address] [place] [address]   [phone]

## 2022-07-27 NOTE — ANESTHESIA PREPROCEDURE EVALUATION
Relevant Problems   GASTROINTESTINAL   (+) GERD (gastroesophageal reflux disease)      ENDOCRINE   (+) Diabetes (HCC)       Anesthetic History   No history of anesthetic complications            Review of Systems / Medical History  Patient summary reviewed and pertinent labs reviewed    Pulmonary  Within defined limits                 Neuro/Psych   Within defined limits           Cardiovascular                  Exercise tolerance: >4 METS     GI/Hepatic/Renal     GERD: well controlled           Endo/Other    Diabetes (PreDiabetic)    Obesity     Other Findings   Comments: Uses marijuana regularly-UDS pending         Physical Exam    Airway  Mallampati: II  TM Distance: 4 - 6 cm  Neck ROM: normal range of motion   Mouth opening: Normal     Cardiovascular  Regular rate and rhythm,  S1 and S2 normal,  no murmur, click, rub, or gallop             Dental    Dentition: Caps/crowns     Pulmonary  Breath sounds clear to auscultation               Abdominal  GI exam deferred       Other Findings            Anesthetic Plan    ASA: 2  Anesthesia type: MAC          Induction: Intravenous  Anesthetic plan and risks discussed with: Patient

## 2022-07-27 NOTE — H&P
HPI: Mireya Bear is a 59 y.o. male presenting with chief complain of need for crc screening. Past Medical History:   Diagnosis Date    Diabetes ([address]     Borderline    GERD (gastroesophageal reflux disease)     H/O mitral valve prolapse     ASYMPTOMATIC, diagnosed back in the 1980's, now has resolved       Past Surgical History:   Procedure Laterality Date    HX ORTHOPAEDIC      left knee surgery    HX ORTHOPAEDIC Left 2009    Arm    [place] ANESTH,SURGERY OF SHOULDER Right     rotator cuff       Family History   Problem Relation Age of Onset    Cancer Father     Hearing Impairment Maternal Uncle 76    Hypertension Maternal Uncle        Social History     Socioeconomic History    Marital status: SINGLE   Tobacco Use    Smoking status: Never    Smokeless tobacco: Never   Vaping Use    Vaping Use: Never used   Substance and Sexual Activity    Alcohol use: Yes     Comment: socially    Drug use: No     Comment: smokes hemp    Sexual activity: Not Currently       Review of Systems - neg        No Known Allergies    Vitals:    07/26/22 0821 07/27/22 0637   BP:  138/82   Pulse:  62   Resp:  18   Temp:  97.9 °F (36.6 °C)   SpO2:  100%   Weight: 111.6 kg (246 lb) 103.9 kg (229 lb)   Height: 5' 11\" (1.803 m) 5' 11\" (1.803 m)       Physical Exam  Constitutional:       Appearance: He is well-developed. HENT:      Head: Normocephalic and atraumatic. Eyes:      Conjunctiva/sclera: Conjunctivae normal.   Abdominal:      General: There is no distension. Palpations: Abdomen is soft. There is no mass. Tenderness: There is no abdominal tenderness. Musculoskeletal:         General: Normal range of motion. Lymphadenopathy:      Cervical: No cervical adenopathy. Skin:     General: Skin is warm and dry. Neurological:      Sensory: No sensory deficit. Psychiatric:         Speech: Speech normal.       Assessment / Plan    colonoscopy    The diagnoses and plan were discussed with the patient.  All questions answered. Plan of care agreed to by all concerned.

## 2022-07-27 NOTE — ANESTHESIA POSTPROCEDURE EVALUATION
Procedure(s):  COLONOSCOPY/Polypectomies. MAC    Anesthesia Post Evaluation      Multimodal analgesia: multimodal analgesia used between 6 hours prior to anesthesia start to PACU discharge  Patient location during evaluation: bedside  Patient participation: complete - patient participated  Level of consciousness: awake  Pain score: 0  Pain management: adequate  Airway patency: patent  Anesthetic complications: no  Cardiovascular status: stable  Respiratory status: acceptable  Hydration status: acceptable  Post anesthesia nausea and vomiting:  controlled  Final Post Anesthesia Temperature Assessment:  Normothermia (36.0-37.5 degrees C)      INITIAL Post-op Vital signs:   Vitals Value Taken Time   /74 07/27/22 0824   Temp 36.9 °C (98.4 °F) 07/27/22 0824   Pulse 56 07/27/22 0829   Resp 11 07/27/22 0829   SpO2 98 % 07/27/22 0830   Vitals shown include unvalidated device data.

## 2022-08-09 ENCOUNTER — TELEPHONE (OUTPATIENT)
Dept: SURGERY | Age: 62
End: 2022-08-09

## 2022-08-09 NOTE — TELEPHONE ENCOUNTER
----- Message from Lei Sosa MD sent at 7/29/2022  7:25 AM EDT -----  Benign polyp(s). Repeat colonoscopy in 3 year(s) as planned. Notified patient of pathology results. Tickler placed in recall for 3 years. Patient understands.

## 2022-12-06 NOTE — PROGRESS NOTES
Beka Velasquez .  1960   No chief complaint on file. HISTORY OF PRESENT ILLNESS  Otf Salmon is a 57 y.o. male who presents today for evaluation of b/l knee pain. Patient rates pain as 6/10 today. Pain has been present for 40 years. Left knee has been much worse in the right knee where he has recurrent locking sensation with a feeling of something snaps out of place in the right knee. Pain is worse with activity better with rest and has had sporadic treatment in the past.  He wears braces to get by his days. Patient denies any fever, chills, chest pain, shortness of breath or calf pain. The remainder of the review of systems is negative. There are no new illness or injuries to report since last seen in the office. There are no changes to medications, allergies, family or social history. PHYSICAL EXAM:   Visit Vitals  Ht 5' 11\" (1.803 m)   Wt 231 lb 12.8 oz (105.1 kg)   BMI 32.33 kg/m²     The patient is a well-developed, well-nourished male   in no acute distress. The patient is alert and oriented times three. The patient is alert and oriented times three. Mood and affect are normal.  LYMPHATIC: lymph nodes are not enlarged and are within normal limits  SKIN: normal in color and non tender to palpation. There are no bruises or abrasions noted. NEUROLOGICAL: Motor sensory exam is within normal limits. Reflexes are equal bilaterally.  There is normal sensation to pinprick and light touch  MUSCULOSKELETAL:  Examination Left knee Right knee   Skin Intact Intact   Range of motion     Effusion + +   Medial joint line tenderness + +   Lateral joint line tenderness - -   Tenderness Pes Bursa - -   Tenderness insertion MCL - -   Tenderness insertion LCL - -   Mohsens - -   Patella crepitus + +   Patella grind - -   Lachman - -   Pivot shift - -   Anterior drawer - -   Posterior drawer - -   Varus stress - -   Valgus stress - -   Neurovascular Intact Intact   Calf Swelling and Tenderness to Palpation - -   Rekha's Test - -   Hamstring Cord Tightness - -        PROCEDURE: Standing AP of both knees show significant degenerative arthritis in all 3 compartments on the left knee with varus angulation    IMAGING: XR of the b/l knees with 2 views obtained in the office dated 12/07/2022 was reviewed and read by Dr. Florencia Stockton: 2 views of the right knee show a large loose body with diffuse degenerative changes in all 3 compartments. IMPRESSION:      ICD-10-CM ICD-9-CM    1. Left knee pain, unspecified chronicity  M25.562 719.46 AMB POC XRAY, KNEE; 1/2 VIEWS      2. Right knee pain, unspecified chronicity  M25.561 719.46 AMB POC XRAY, KNEE; 1/2 VIEWS      3. Primary osteoarthritis of both knees  M17.0 715.16            PLAN:   1. Pt presents today with b/l knee pain due to bilateral knee pain has been longstanding character. The left knee appears to be the worst problem and that is 1 that likely will have to be managed with injections eventually knee replacement. He has a large loose body on the right knee and have gone over options of treatment including surgery and basically he wants to think about the overall plan. Risk factors include: dm, BMI>30  2. No ultrasound exam indicated today  3. No cortisone injection indicated today   4. No Physical/Occupational Therapy indicated today  5. No diagnostic test indicated today:   6. No durable medical equipment indicated today  7. No referral indicated today   8. No medications indicated today:   9. No Narcotic indicated today for short term acute pain secondary to knee pain. Patient given pain medication for short term acute pain relief. Goal is to treat patient according to above plan and to ultimately have patient off all pain medications once appropriate. If chronic pain management is required beyond what is expected for current orthopedic problem, will refer patient to pain management.   was reviewed and will be reviewed with every medication refill request.       RTC as needed      Scribed by Beka Davis [address]) as dictated by Isaias Jade MD    I, Dr. Isaias Jade, confirm that all documentation is accurate.     Isaias Jade M.D.   [facility]

## 2022-12-07 ENCOUNTER — OFFICE VISIT (OUTPATIENT)
Dept: ORTHOPEDIC SURGERY | Age: 62
End: 2022-12-07
Payer: MEDICAID

## 2022-12-07 VITALS — HEIGHT: 71 IN | BODY MASS INDEX: 32.45 KG/M2 | WEIGHT: 231.8 LBS

## 2022-12-07 DIAGNOSIS — M25.561 RIGHT KNEE PAIN, UNSPECIFIED CHRONICITY: ICD-10-CM

## 2022-12-07 DIAGNOSIS — M17.0 PRIMARY OSTEOARTHRITIS OF BOTH KNEES: ICD-10-CM

## 2022-12-07 DIAGNOSIS — M25.562 LEFT KNEE PAIN, UNSPECIFIED CHRONICITY: Primary | ICD-10-CM

## 2023-08-05 ENCOUNTER — HOSPITAL ENCOUNTER (EMERGENCY)
Facility: HOSPITAL | Age: 63
Discharge: HOME | End: 2023-08-05
Attending: STUDENT IN AN ORGANIZED HEALTH CARE EDUCATION/TRAINING PROGRAM
Payer: MEDICAID

## 2023-08-05 VITALS
OXYGEN SATURATION: 97 % | HEIGHT: 71 IN | BODY MASS INDEX: 31.08 KG/M2 | WEIGHT: 222 LBS | RESPIRATION RATE: 18 BRPM | TEMPERATURE: 102.5 F | HEART RATE: 93 BPM | DIASTOLIC BLOOD PRESSURE: 81 MMHG | SYSTOLIC BLOOD PRESSURE: 141 MMHG

## 2023-08-05 DIAGNOSIS — U07.1 COVID-19: Primary | ICD-10-CM

## 2023-08-05 LAB
ANION GAP SERPL CALC-SCNC: 5 MMOL/L (ref 3–18)
BASOPHILS # BLD: 0.1 K/UL (ref 0–0.1)
BASOPHILS NFR BLD: 0 % (ref 0–2)
BUN SERPL-MCNC: 12 MG/DL (ref 7–18)
BUN/CREAT SERPL: 10 (ref 12–20)
CALCIUM SERPL-MCNC: 8.7 MG/DL (ref 8.5–10.1)
CHLORIDE SERPL-SCNC: 104 MMOL/L (ref 100–111)
CO2 SERPL-SCNC: 26 MMOL/L (ref 21–32)
CREAT SERPL-MCNC: 1.2 MG/DL (ref 0.6–1.3)
DIFFERENTIAL METHOD BLD: ABNORMAL
EKG ATRIAL RATE: 93 BPM
EKG DIAGNOSIS: NORMAL
EKG P AXIS: 59 DEGREES
EKG P-R INTERVAL: 158 MS
EKG Q-T INTERVAL: 326 MS
EKG QRS DURATION: 86 MS
EKG QTC CALCULATION (BAZETT): 405 MS
EKG R AXIS: -3 DEGREES
EKG T AXIS: 43 DEGREES
EKG VENTRICULAR RATE: 93 BPM
EOSINOPHIL # BLD: 0 K/UL (ref 0–0.4)
EOSINOPHIL NFR BLD: 0 % (ref 0–5)
ERYTHROCYTE [DISTWIDTH] IN BLOOD BY AUTOMATED COUNT: 13.5 % (ref 11.6–14.5)
FLUAV RNA SPEC QL NAA+PROBE: NOT DETECTED
FLUBV RNA SPEC QL NAA+PROBE: NOT DETECTED
GLUCOSE SERPL-MCNC: 108 MG/DL (ref 74–99)
HCT VFR BLD AUTO: 43.5 % (ref 36–48)
HGB BLD-MCNC: 14.9 G/DL (ref 13–16)
IMM GRANULOCYTES # BLD AUTO: 0 K/UL (ref 0–0.04)
IMM GRANULOCYTES NFR BLD AUTO: 0 % (ref 0–0.5)
LACTATE SERPL-SCNC: 1 MMOL/L (ref 0.4–2)
LYMPHOCYTES # BLD: 1.4 K/UL (ref 0.9–3.6)
LYMPHOCYTES NFR BLD: 12 % (ref 21–52)
MCH RBC QN AUTO: 29.6 PG (ref 24–34)
MCHC RBC AUTO-ENTMCNC: 34.3 G/DL (ref 31–37)
MCV RBC AUTO: 86.5 FL (ref 78–100)
MONOCYTES # BLD: 1.4 K/UL (ref 0.05–1.2)
MONOCYTES NFR BLD: 12 % (ref 3–10)
NEUTS SEG # BLD: 8.7 K/UL (ref 1.8–8)
NEUTS SEG NFR BLD: 75 % (ref 40–73)
NRBC # BLD: 0 K/UL (ref 0–0.01)
NRBC BLD-RTO: 0 PER 100 WBC
NT PRO BNP: 42 PG/ML (ref 0–900)
PLATELET # BLD AUTO: 243 K/UL (ref 135–420)
PMV BLD AUTO: 9.8 FL (ref 9.2–11.8)
POTASSIUM SERPL-SCNC: 3.7 MMOL/L (ref 3.5–5.5)
RBC # BLD AUTO: 5.03 M/UL (ref 4.35–5.65)
SARS-COV-2 RNA RESP QL NAA+PROBE: DETECTED
SODIUM SERPL-SCNC: 135 MMOL/L (ref 136–145)
TROPONIN I SERPL HS-MCNC: 10 NG/L (ref 0–78)
WBC # BLD AUTO: 11.6 K/UL (ref 4.6–13.2)

## 2023-08-05 PROCEDURE — 83880 ASSAY OF NATRIURETIC PEPTIDE: CPT

## 2023-08-05 PROCEDURE — 4500000002 HC ER NO CHARGE

## 2023-08-05 PROCEDURE — 84484 ASSAY OF TROPONIN QUANT: CPT

## 2023-08-05 PROCEDURE — 87040 BLOOD CULTURE FOR BACTERIA: CPT

## 2023-08-05 PROCEDURE — 93005 ELECTROCARDIOGRAM TRACING: CPT | Performed by: STUDENT IN AN ORGANIZED HEALTH CARE EDUCATION/TRAINING PROGRAM

## 2023-08-05 PROCEDURE — 83605 ASSAY OF LACTIC ACID: CPT

## 2023-08-05 PROCEDURE — 85025 COMPLETE CBC W/AUTO DIFF WBC: CPT

## 2023-08-05 PROCEDURE — 93010 ELECTROCARDIOGRAM REPORT: CPT | Performed by: INTERNAL MEDICINE

## 2023-08-05 PROCEDURE — 6370000000 HC RX 637 (ALT 250 FOR IP): Performed by: STUDENT IN AN ORGANIZED HEALTH CARE EDUCATION/TRAINING PROGRAM

## 2023-08-05 PROCEDURE — 87636 SARSCOV2 & INF A&B AMP PRB: CPT

## 2023-08-05 PROCEDURE — 80048 BASIC METABOLIC PNL TOTAL CA: CPT

## 2023-08-05 RX ORDER — ACETAMINOPHEN 325 MG/1
650 TABLET ORAL
Status: COMPLETED | OUTPATIENT
Start: 2023-08-05 | End: 2023-08-05

## 2023-08-05 RX ADMIN — ACETAMINOPHEN 325MG 650 MG: 325 TABLET ORAL at 09:34

## 2023-08-05 ASSESSMENT — PAIN - FUNCTIONAL ASSESSMENT: PAIN_FUNCTIONAL_ASSESSMENT: 0-10

## 2023-08-05 ASSESSMENT — PAIN DESCRIPTION - LOCATION: LOCATION: HEAD;THROAT

## 2023-08-05 ASSESSMENT — PAIN SCALES - GENERAL: PAINLEVEL_OUTOF10: 7

## 2023-08-05 NOTE — ED TRIAGE NOTES
Pt c/o productive cough, headache, sore throat and shortness of breath x 3 days ago. Pt was tested + covid yesterday at home. Pt also c/o urinary frequency at night but also drinking a lot of fluids before going to bed.

## 2023-08-06 LAB
BACTERIA SPEC CULT: NORMAL
BACTERIA SPEC CULT: NORMAL
SERVICE CMNT-IMP: NORMAL
SERVICE CMNT-IMP: NORMAL

## 2023-08-08 ASSESSMENT — ENCOUNTER SYMPTOMS
ABDOMINAL PAIN: 0
ABDOMINAL DISTENTION: 0
SHORTNESS OF BREATH: 1
CHEST TIGHTNESS: 0
COUGH: 1
DIARRHEA: 0
CONSTIPATION: 0
EYE PAIN: 0
BACK PAIN: 0
FACIAL SWELLING: 0
BLOOD IN STOOL: 0

## 2023-08-08 NOTE — H&P
EMERGENCY DEPARTMENT HISTORY AND PHYSICAL EXAM    1:29 PM      Date: 8/5/2023  Patient Name: Lucio Veloz Sr.    History of Presenting Illness     Chief Complaint   Patient presents with    Fever    Cough    Shortness of Breath       History From: Patient  HPI  55-year-old male who presents with fever cough shortness of breath. Patient states that for the past few days he has had worsening of the symptoms. States that he took a at home COVID test and tested positive. Up-to-date on all vaccinations. When assessing ROS he denies any chest pain, lower extremity swelling, hemoptysis, abdominal pain, or any other changes. Nursing Notes were all reviewed and agreed with or any disagreements were addressed in the HPI. PCP: Ban Peng MD    No current facility-administered medications for this encounter. Current Outpatient Medications   Medication Sig Dispense Refill    nirmatrelvir/ritonavir 300/100 (PAXLOVID, 300/100,) 20 x 150 MG & 10 x 100MG TBPK Take 3 tablets (two 150 mg nirmatrelvir and one 100 mg ritonavir tablets) by mouth every 12 hours for 5 days. 30 tablet 0    calcium carbonate (OS-HARRIS) 1250 (500 Ca) MG chewable tablet Take 1 tablet by mouth as needed      ergocalciferol (ERGOCALCIFEROL) 1.25 MG (07720 UT) capsule Take 50,000 Units by mouth      zolpidem (AMBIEN) 10 MG tablet Take 10 mg by mouth.          Past History     Past Medical History:  Past Medical History:   Diagnosis Date    Diabetes ([address])     Borderline    GERD (gastroesophageal reflux disease)     H/O mitral valve prolapse     ASYMPTOMATIC, diagnosed back in the 1980's, now has resolved       Past Surgical History:  Past Surgical History:   Procedure Laterality Date    ANESTH,SURGERY OF SHOULDER Right     rotator cuff    COLONOSCOPY N/A 7/27/2022    COLONOSCOPY/Polypectomies performed by Cheryle Newcomer, MD at [address]      left knee surgery    ORTHOPEDIC SURGERY Left 2009    [facility]       Family

## 2023-12-04 ENCOUNTER — HOSPITAL ENCOUNTER (OUTPATIENT)
Facility: HOSPITAL | Age: 63
Discharge: HOME | End: 2023-12-07
Payer: MEDICARE

## 2023-12-04 DIAGNOSIS — M25.561 ACUTE PAIN OF BOTH KNEES: ICD-10-CM

## 2023-12-04 DIAGNOSIS — M25.562 ACUTE PAIN OF BOTH KNEES: ICD-10-CM

## 2023-12-04 DIAGNOSIS — M25.511 RIGHT SHOULDER PAIN, UNSPECIFIED CHRONICITY: ICD-10-CM

## 2023-12-04 PROCEDURE — 73030 X-RAY EXAM OF SHOULDER: CPT

## 2023-12-04 PROCEDURE — 73564 X-RAY EXAM KNEE 4 OR MORE: CPT

## 2025-03-31 ENCOUNTER — OFFICE VISIT (OUTPATIENT)
Age: 65
End: 2025-03-31
Payer: MEDICARE

## 2025-03-31 DIAGNOSIS — Z86.79 H/O MITRAL VALVE PROLAPSE: ICD-10-CM

## 2025-03-31 DIAGNOSIS — R07.9 CHEST PAIN, UNSPECIFIED TYPE: Primary | ICD-10-CM

## 2025-03-31 PROCEDURE — 99204 OFFICE O/P NEW MOD 45 MIN: CPT | Performed by: INTERNAL MEDICINE

## 2025-03-31 PROCEDURE — 93000 ELECTROCARDIOGRAM COMPLETE: CPT | Performed by: INTERNAL MEDICINE

## 2025-03-31 PROCEDURE — 1123F ACP DISCUSS/DSCN MKR DOCD: CPT | Performed by: INTERNAL MEDICINE

## 2025-03-31 RX ORDER — MECLIZINE HYDROCHLORIDE 25 MG/1
TABLET ORAL
COMMUNITY
Start: 2025-01-22

## 2025-03-31 NOTE — PROGRESS NOTES
[facility]    Anival Spicer Sr. is 64 y.o. male     Patient is here today for cardiac evaluation  Denies prior history of MI or CHF  Sent to me for relation of intermittent chest pain.  According to patient he has on and off chest pain for almost a decade.  This happens probably once a month or once every other month.  Random episode of sharp pain which usually also associate with some tightness in the left side of the chest and then radiated to his back.  Sometimes last for several hours and sometimes throughout the day.  Occasional sweating.  No nausea or vomiting.  Random episode without any exertional symptoms  No orthopnea PND.  No significant palpitation, presyncope syncope  Denies any nausea, vomiting, abdominal pain, fever, chills, sputum production. No hematuria or other bleeding complaints    Past Medical History:   Diagnosis Date    Diabetes (HCC)     Borderline    GERD (gastroesophageal reflux disease)        Review of Systems:  Cardiac symptoms as noted above in HPI. All others negative.  Denies fatigue, malaise, skin rash, joint pain, blurring vision, photophobia, neck pain, hemoptysis, chronic cough, nausea, vomiting, hematuria, burning micturition, BRBPR, chronic headaches.    Current Outpatient Medications   Medication Sig    meclizine (ANTIVERT) 25 MG tablet take 1 tablet by mouth two to three times a day if needed for dizziness    calcium carbonate (OS-HARRIS) 1250 (500 Ca) MG chewable tablet Take 1 tablet by mouth as needed    ergocalciferol (ERGOCALCIFEROL) 1.25 MG (71444 UT) capsule Take 1 capsule by mouth    zolpidem (AMBIEN) 10 MG tablet Take 1 tablet by mouth.     No current facility-administered medications for this visit.       Past Surgical History:   Procedure Laterality Date    ANESTH,SURGERY OF SHOULDER Right     rotator cuff    COLONOSCOPY N/A 7/27/2022    COLONOSCOPY/Polypectomies performed by Leno Gamez MD at

## 2025-04-17 ENCOUNTER — OFFICE VISIT (OUTPATIENT)
Age: 65
End: 2025-04-17
Payer: MEDICARE

## 2025-04-17 VITALS
WEIGHT: 226 LBS | RESPIRATION RATE: 18 BRPM | HEART RATE: 90 BPM | BODY MASS INDEX: 31.52 KG/M2 | DIASTOLIC BLOOD PRESSURE: 78 MMHG | OXYGEN SATURATION: 97 % | TEMPERATURE: 98.3 F | SYSTOLIC BLOOD PRESSURE: 142 MMHG

## 2025-04-17 DIAGNOSIS — H81.13 BENIGN PAROXYSMAL POSITIONAL VERTIGO DUE TO BILATERAL VESTIBULAR DISORDER: Primary | ICD-10-CM

## 2025-04-17 PROCEDURE — 99204 OFFICE O/P NEW MOD 45 MIN: CPT | Performed by: STUDENT IN AN ORGANIZED HEALTH CARE EDUCATION/TRAINING PROGRAM

## 2025-04-17 PROCEDURE — 1123F ACP DISCUSS/DSCN MKR DOCD: CPT | Performed by: STUDENT IN AN ORGANIZED HEALTH CARE EDUCATION/TRAINING PROGRAM

## 2025-04-17 PROCEDURE — 1159F MED LIST DOCD IN RCRD: CPT | Performed by: STUDENT IN AN ORGANIZED HEALTH CARE EDUCATION/TRAINING PROGRAM

## 2025-04-17 ASSESSMENT — ENCOUNTER SYMPTOMS
COUGH: 0
BACK PAIN: 1
VOMITING: 0
SHORTNESS OF BREATH: 0
NAUSEA: 0

## 2025-04-17 ASSESSMENT — PATIENT HEALTH QUESTIONNAIRE - PHQ9
2. FEELING DOWN, DEPRESSED OR HOPELESS: NOT AT ALL
SUM OF ALL RESPONSES TO PHQ QUESTIONS 1-9: 0
SUM OF ALL RESPONSES TO PHQ QUESTIONS 1-9: 0
1. LITTLE INTEREST OR PLEASURE IN DOING THINGS: NOT AT ALL
SUM OF ALL RESPONSES TO PHQ QUESTIONS 1-9: 0
SUM OF ALL RESPONSES TO PHQ QUESTIONS 1-9: 0

## 2025-04-17 NOTE — PROGRESS NOTES
Chief Complaint   Patient presents with    Dizziness     Had 3 times in his life but this time was the worst. Every time he have a dizzy spell he think it's vertigo    New Patient    Chest Pain       Medication list and allergies have been reviewed with Anival Spicer Sr. and updated as of today's date.     I have gone over all Medical, Surgical and Social History with Anival Spicer Sr. and updated/added the information accordingly.

## 2025-04-17 NOTE — PROGRESS NOTES
Anival Spicer  is a 64 y.o. male .presents for Dizziness (Had 3 times in his life but this time was the worst. Every time he have a dizzy spell he think it's vertigo), New Patient, and Chest Pain  A 64 years old male patient referred here for evaluation of dizziness.  He mentioned that he had 3 spells of \"vertigo\" since 2004 or 5.  Last spell was about 3 to 4 months ago.  He mentioned that while driving to [facility], he developed severe toothache on the left side and subsequently developed a spinning sensation.  Not associated with nausea or vomiting.  Symptoms persisted for about 4 days.  Was not able to move around.  Was feeling better at nighttime.  Was difficult to open his eyes; preferred to be in a dark place; light might trigger the spells.  Has to lay still.  Was given meclizine.  After returning back to [place], he had a mild episode.  Was seen in the emergency room and said he had a CT scan; not available in [facility] care but from the referral note, it was reported to be negative for any acute process.  He mentioned ear pain associated with the toothache.  No ear discharge.  No changes in hearing or ear fullness sensation.  Has knee pain causing some difficulty walking.  Otherwise no extremity weakness or unsteadiness.  No numbness.  No changes in his vision and no diplopia.  No head trauma.      Review of Systems   Constitutional:  Negative for chills, fever and unexpected weight change.   HENT:  Negative for hearing loss and tinnitus.    Eyes:  Negative for visual disturbance.   Respiratory:  Negative for cough and shortness of breath.    Cardiovascular:  Positive for chest pain (yesterday). Negative for leg swelling.   Gastrointestinal:  Negative for nausea and vomiting.   Genitourinary:  Positive for frequency and urgency. Negative for dysuria.   Musculoskeletal:  Positive for back pain. Negative for neck pain.   Skin:  Positive for rash (right forearm).   Neurological:  Positive for

## 2025-04-20 ENCOUNTER — HOSPITAL ENCOUNTER (EMERGENCY)
Facility: HOSPITAL | Age: 65
Discharge: HOME | End: 2025-04-20
Attending: EMERGENCY MEDICINE
Payer: MEDICARE

## 2025-04-20 VITALS
OXYGEN SATURATION: 99 % | SYSTOLIC BLOOD PRESSURE: 182 MMHG | TEMPERATURE: 98.2 F | HEART RATE: 61 BPM | DIASTOLIC BLOOD PRESSURE: 92 MMHG | RESPIRATION RATE: 18 BRPM

## 2025-04-20 DIAGNOSIS — K08.89 PAIN, DENTAL: Primary | ICD-10-CM

## 2025-04-20 PROCEDURE — 96372 THER/PROPH/DIAG INJ SC/IM: CPT

## 2025-04-20 PROCEDURE — 6360000002 HC RX W HCPCS: Performed by: EMERGENCY MEDICINE

## 2025-04-20 PROCEDURE — 99284 EMERGENCY DEPT VISIT MOD MDM: CPT

## 2025-04-20 RX ORDER — KETOROLAC TROMETHAMINE 15 MG/ML
30 INJECTION, SOLUTION INTRAMUSCULAR; INTRAVENOUS
Status: COMPLETED | OUTPATIENT
Start: 2025-04-20 | End: 2025-04-20

## 2025-04-20 RX ORDER — OXYCODONE AND ACETAMINOPHEN 5; 325 MG/1; MG/1
1 TABLET ORAL EVERY 6 HOURS PRN
Qty: 12 TABLET | Refills: 0 | Status: SHIPPED | OUTPATIENT
Start: 2025-04-20 | End: 2025-04-23

## 2025-04-20 RX ORDER — IBUPROFEN 800 MG/1
800 TABLET, FILM COATED ORAL EVERY 8 HOURS
Qty: 9 TABLET | Refills: 0 | Status: SHIPPED | OUTPATIENT
Start: 2025-04-20 | End: 2025-04-23

## 2025-04-20 RX ORDER — OXYCODONE AND ACETAMINOPHEN 5; 325 MG/1; MG/1
1 TABLET ORAL
Refills: 0 | Status: DISCONTINUED | OUTPATIENT
Start: 2025-04-20 | End: 2025-04-20

## 2025-04-20 RX ORDER — METRONIDAZOLE 500 MG/1
500 TABLET ORAL 3 TIMES DAILY
Qty: 21 TABLET | Refills: 0 | Status: SHIPPED | OUTPATIENT
Start: 2025-04-20 | End: 2025-04-27

## 2025-04-20 RX ORDER — KETOROLAC TROMETHAMINE 15 MG/ML
30 INJECTION, SOLUTION INTRAMUSCULAR; INTRAVENOUS
Status: DISCONTINUED | OUTPATIENT
Start: 2025-04-20 | End: 2025-04-20

## 2025-04-20 RX ADMIN — KETOROLAC TROMETHAMINE 30 MG: 15 INJECTION, SOLUTION INTRAMUSCULAR; INTRAVENOUS at 04:08

## 2025-04-20 ASSESSMENT — LIFESTYLE VARIABLES
HOW MANY STANDARD DRINKS CONTAINING ALCOHOL DO YOU HAVE ON A TYPICAL DAY: PATIENT DOES NOT DRINK
HOW OFTEN DO YOU HAVE A DRINK CONTAINING ALCOHOL: NEVER

## 2025-04-20 ASSESSMENT — PAIN DESCRIPTION - DESCRIPTORS: DESCRIPTORS: ACHING

## 2025-04-20 ASSESSMENT — PAIN DESCRIPTION - ORIENTATION: ORIENTATION: LEFT

## 2025-04-20 ASSESSMENT — PAIN - FUNCTIONAL ASSESSMENT
PAIN_FUNCTIONAL_ASSESSMENT: ACTIVITIES ARE NOT PREVENTED
PAIN_FUNCTIONAL_ASSESSMENT: 0-10

## 2025-04-20 ASSESSMENT — PAIN DESCRIPTION - PAIN TYPE: TYPE: ACUTE PAIN

## 2025-04-20 ASSESSMENT — PAIN SCALES - GENERAL
PAINLEVEL_OUTOF10: 10
PAINLEVEL_OUTOF10: 10

## 2025-04-20 ASSESSMENT — PAIN DESCRIPTION - LOCATION: LOCATION: EAR;FACE;HEAD

## 2025-04-20 ASSESSMENT — PAIN DESCRIPTION - FREQUENCY: FREQUENCY: CONTINUOUS

## 2025-04-20 NOTE — ED TRIAGE NOTES
Patient A/O x 4, presented to the ED with complaint of headache, facial swelling, abscess, ear pain off and on x 1 month. Patient states he took motrin 800 mg at 2139.

## 2025-04-20 NOTE — DISCHARGE INSTRUCTIONS
Follow-up with one of the dental clinics below:  [facility] [phone]  [facility] [phone]  [facility] [phone]  [facility] [phone]  [facility] [phone]  [facility] [phone]    Call to schedule an appointment as soon as possible.

## 2025-04-20 NOTE — ED PROVIDER NOTES
EMERGENCY DEPARTMENT HISTORY AND PHYSICAL EXAM    3:47 AM EDT seen at this time in room QC        Date: 4/20/2025  Patient Name: Anival Spicer Sr.    History of Presenting Illness     Chief Complaint   Patient presents with    Headache    Dental Pain    Ear Pain         History Provided By: patient    Additional History (Context): Anival Spicer Sr. is a 64 y.o. male presents with insidious tooth on the left mandible, has flared up having severe pain not controlled with ibuprofen saw urgent care today and started on Augmentin and has taken 1 dose.  Pain is getting up into his left ear and making his mouth sore..    PCP: Arthur Flor MD    Chief Complaint:   Duration:    Timing:    Location:   Quality:   Severity:   Modifying Factors:   Associated Symptoms:       Current Facility-Administered Medications   Medication Dose Route Frequency Provider Last Rate Last Admin    oxyCODONE-acetaminophen (PERCOCET) 5-325 MG per tablet 1 tablet  1 tablet Oral NOW Quincy Colbert MD         Current Outpatient Medications   Medication Sig Dispense Refill    oxyCODONE-acetaminophen (PERCOCET) 5-325 MG per tablet Take 1 tablet by mouth every 6 hours as needed for Pain for up to 3 days. Intended supply: 3 days. Take lowest dose possible to manage pain Max Daily Amount: 4 tablets 12 tablet 0    metroNIDAZOLE (FLAGYL) 500 MG tablet Take 1 tablet by mouth 3 times daily for 7 days 21 tablet 0    ibuprofen (ADVIL;MOTRIN) 800 MG tablet Take 1 tablet by mouth every 8 (eight) hours for 3 days 9 tablet 0    meclizine (ANTIVERT) 25 MG tablet take 1 tablet by mouth two to three times a day if needed for dizziness      ergocalciferol (ERGOCALCIFEROL) 1.25 MG (02736 UT) capsule Take 1 capsule by mouth      calcium carbonate (OS-HARRIS) 1250 (500 Ca) MG chewable tablet Take 1 tablet by mouth as needed (Patient not taking: Reported on 4/17/2025)      zolpidem (AMBIEN) 10 MG tablet Take 1 tablet by mouth. (Patient not taking: Reported

## (undated) DEVICE — Device: Brand: OLYMPUS

## (undated) DEVICE — YANKAUER,SMOOTH HANDLE,HIGH CAPACITY: Brand: MEDLINE INDUSTRIES, INC.

## (undated) DEVICE — FLUFF AND POLYMER UNDERPAD,EXTRA HEAVY: Brand: WINGS

## (undated) DEVICE — GOWN ISOL IMPERV UNIV, DISP, OPEN BACK, BLUE --

## (undated) DEVICE — MEDI-VAC NON-CONDUCTIVE SUCTION TUBING: Brand: CARDINAL HEALTH

## (undated) DEVICE — LINER SUCT CANSTR 3000CC PLAS SFT PRE ASSEMB W/OUT TBNG W/

## (undated) DEVICE — SYR 20ML LL STRL LF --

## (undated) DEVICE — SYR 10ML LUER LOK 1/5ML GRAD --

## (undated) DEVICE — CANNULA NSL AD TBNG L14FT STD PVC O2 CRV CONN NONFLARED NSL

## (undated) DEVICE — CATHETER SUCT TR FL TIP 14FR W/ O CTRL

## (undated) DEVICE — CATHETER THOR 36FR DIA10.7MM POLYVI CHL TRCR TIP STR SFT

## (undated) DEVICE — CANNULA ORIG TL CLR W FOAM CUSHIONS AND 14FT SUPL TB 3 CHN

## (undated) DEVICE — SOLUTION IRRIG 1000ML H2O STRL BLT

## (undated) DEVICE — FORCEPS BX L240CM JAW DIA2.8MM L CAP W/ NDL MIC MESH TOOTH

## (undated) DEVICE — SINGLE-USE POLYPECTOMY SNARE: Brand: CAPTIFLEX

## (undated) DEVICE — SYRINGE MED 25GA 3ML L5/8IN SUBQ PLAS W/ DETACH NDL SFTY

## (undated) DEVICE — ENDOSCOPY PUMP TUBING/ CAP SET: Brand: ERBE

## (undated) DEVICE — SYR 50ML SLIP TIP NSAF LF STRL --

## (undated) DEVICE — GAUZE,SPONGE,4"X4",16PLY,STRL,LF,10/TRAY: Brand: MEDLINE